# Patient Record
Sex: MALE | Race: WHITE | NOT HISPANIC OR LATINO | URBAN - METROPOLITAN AREA
[De-identification: names, ages, dates, MRNs, and addresses within clinical notes are randomized per-mention and may not be internally consistent; named-entity substitution may affect disease eponyms.]

---

## 2022-06-06 ENCOUNTER — TELEPHONE (OUTPATIENT)
Dept: TRANSPLANT | Facility: CLINIC | Age: 62
End: 2022-06-06
Payer: COMMERCIAL

## 2022-06-06 NOTE — TELEPHONE ENCOUNTER
----- Message from Taran Eason sent at 6/6/2022  5:22 PM CDT -----    Hepatology referral received and scanned into media; pt chart sent to referral nurse for medical review.     No labs sent with pt ref. Will call Md office.    Referring Provider: GREGG ALCALA NP   Phone: [phone]   Fax: [phone]                .

## 2022-06-07 ENCOUNTER — TELEPHONE (OUTPATIENT)
Dept: TRANSPLANT | Facility: CLINIC | Age: 62
End: 2022-06-07
Payer: COMMERCIAL

## 2022-06-07 ENCOUNTER — TELEPHONE (OUTPATIENT)
Dept: HEPATOLOGY | Facility: CLINIC | Age: 62
End: 2022-06-07
Payer: COMMERCIAL

## 2022-06-07 NOTE — TELEPHONE ENCOUNTER
----- Message from Taran Eason sent at 6/6/2022  5:22 PM CDT -----    Hepatology referral received and scanned into media; pt chart sent to referral nurse for medical review.     No labs sent with pt ref. Will call Md office.    Referring Provider: GREGG ALCALA NP   Phone: [phone]   Fax: [phone]                .

## 2022-06-07 NOTE — TELEPHONE ENCOUNTER
----- Message from Taran Eason sent at 6/7/2022 12:03 PM CDT -----  Regarding: FW: [facility]    Returned called to ref md office and confirm that we have the pt CMP.  .  ----- Message -----  From: Khadra Brooks  Sent: 6/7/2022  10:27 AM CDT  To: [facility] Liver Referral Pool  Subject: Rufina Santacruz @  [facility] calling to verify that a fax was sent with [facility].      Call: [phone]

## 2022-06-07 NOTE — TELEPHONE ENCOUNTER
Referral from Referring Provider: GREGG ALCALA NP   Phone: [phone]   Fax: [phone]     Pt with cirrhosis.  INR, CR WNL TB 2.1 Two beers a day and one mix drink a night.  Pt is working on decreasing intake and getting help from his PCP.  Pt informed he will have to maintain total sobriety prior to any consideration for transplant. Pt acknowledges this and is working on it. Pt will be scheduled in the hepatology clinic till sobriety. Called referring re any labs, requested they be faxed only labs received were INR and WBC with CR and TB written on referral form.     Insurance [employer] [address]  [phone]  YAQ [id number]

## 2022-06-07 NOTE — TELEPHONE ENCOUNTER
----- Message from Taran Eason sent at 6/7/2022  9:40 AM CDT -----    Called ref Md office and a message was sent to the staff for a CMP done on the pt.   .  ----- Message -----  From: Taran Eason  Sent: 6/6/2022   5:23 PM CDT  To: Taarn Gary      Hepatology referral received and scanned into media; pt chart sent to referral nurse for medical review.     No labs sent with pt ref. Will call Md office.    Referring Provider: GREGG ALCALA NP   Phone: [phone]   Fax: [phone]                .

## 2022-07-23 PROBLEM — K74.60 CIRRHOSIS OF LIVER WITHOUT ASCITES: Status: ACTIVE | Noted: 2022-07-23

## 2022-07-23 NOTE — PROGRESS NOTES
"   [facility] Consultation Note    Reason for Consult:  There were no encounter diagnoses.    PCP: Primary Doctor No   [address] / [place] [address]    HPI:  This is a 61 y.o. male here for evaluation of: Cirrhosis    61 yr old male, initially found to have thrombocytopenia, elevated enzymes, fatty liver on u/S with hepatosplenomegaly.  Raised suspicion for cirrhosis.  Had h/o alcohol intake, Two beers a day and one mix drink at night.  Pt is working on decreasing intake and getting help from his PCP.  Futher Eval of elevated enzymes revealed, ELVIA+, AMA+, IgG elevated 2280, elevated IgA 886.      Fibroscan showed S0 steatosis, F4 fibrosis c/w cirrhosis.      Liver biopsy on 5/5/2022 showed:  Chronic hepatitis mild to moderate steatosis, moderate chronic inflammation, and stage 4 fibrosis.     EGD 7/15/22: no varices.     Pt was informed he will have to maintain total sobriety prior to any consideration for transplant. Pt acknowledged this and is working on it.     I have told patient "No More alcohol"     INR normal, CR normal T Bili 2.1     Hepatitis C: No  Hepatitis B: No  Fatty liver: Yes  Encephalopathy: No  Post-hospital discharge: No  Symptoms: None    Primary hepatic manifestations:  Fatigue:Yes - improved  Edema:No  Ascites:No  Encephalopathy:No  Abdominal pain:No  GI bleeds: No  Pruritus:No  Weight Changes:No  Changes in Bowel habits: No  Muscle cramps:No    Risk factors for liver disease:  No jaundice  No transfusions  No IVDU  Did not snort cocaine or similar agents  Did not live with anyone with hepatitis B or C  Sexual partner not tested  No hepatotoxic medications  No exposure to industrial toxins  Alcohol: Stopped 8 weeks , around May 25-June 1. Drank 6-pack /day and 2 mixed drinks in the evening.   [place] off [place] as [place]sea , and [place]        ROS:  Constitutional: No fevers, chills, weight changes, fatigue  ENT: No allergies, nosebleeds,   CV: No chest " pain  Pulm: No cough, shortness of breath  Ophtho: No vision changes  GI/Liver: see HPI  Derm: No rash, itching  Heme: No swollen glands, bruising  MSK: Right foot gout , end of the day swelling end of the day. - talk to PCP. May want to get a doppler on the leg.   : No dysuria, hematuria, decrease in urine output  Endo: No hot or cold intolerance  Neuro: No confusion, disorientation, difficulty with sleep, memory, concentration, syncope, seizure  Psych: No anxiety, depression    Medical History:  has no past medical history on file.    Surgical History:  has no past surgical history on file.    Family History: family history is not on file..     Social History:      Review of patient's allergies indicates:   Allergen Reactions    Penicillins Anaphylaxis         Objective Findings:    Vital Signs:  /81 (BP Location: Right arm, Patient Position: Sitting, BP Method: Large (Automatic))   Pulse 70   Temp 98 °F (36.7 °C) (Oral)   Wt 100.7 kg (222 lb)   SpO2 98% Comment: RA  There is no height or weight on file to calculate BMI.    Physical Exam:  General Appearance: Well appearing in no acute distress  Head:   Normocephalic, without obvious abnormality  Eyes:    No scleral icterus, EOMI  ENT: Neck supple, Lips, mucosa, and tongue normal; teeth and gums normal  Lungs: CTA bilaterally in anterior and posterior fields, no wheezes, no crackles.  Heart:  Regular rate and rhythm, S1, S2 normal, no murmurs heard  Abdomen: Soft, non tender, non distended with positive bowel sounds in all four quadrants. No hepatosplenomegaly, ascites, or mass  Extremities: 2+ pulses, no clubbing, cyanosis or edema  Skin: No rash  Neurologic: CN II-XII intact, alert, oriented x 3. No asterixis      Labs:  No results found for: WBC, HGB, HCT, PLT, CHOL, TRIG, HDL, LDLDIRECT, INR, CREATININE, BUN, BILITOT, ALT, AST, ALKPHOS, NA, K, CL, CO2, TSH, PSA, GLUF, HGBA1C, MICROALBUR, AFP    Imaging:       Endoscopy:      Assessment:  No  diagnosis found.   Well-compensated Cirrhosis, alcohol related, could be autoimmune (positive 1:40 ASMA), but markers are borderline elevated and IgG 3000 was after vaccine. And liver biopsy showed chronic hepatitis but no autoimmune hepatitis features. And showed cirrhosis.   Viral hep markers neg, A1AT and cerulo normal.       Recommendations:  -  Labs today, and every 6 months after:  CBC, CMP, PT INR, PETH  -  Low salt in the diet, avoid canned, bottled and processed foods.  -  Continue current meds  -  Ultrasound of abdomen and AFP every 6 months, next due in November 2022   -  No alcohol, smoking, sedatives and meds with codeine.  -  Avoid high intake of Tylenol (more than 4 extra-strength (500 mg) pills in one day)  -  Call us if any bleeding, fevers, confusion, disorientation occur  -  Endoscopy: per GI MD locally  -  Transplant option discussed, will evaluate when MELD >15.  -  Return in 3 months.      No follow-ups on file.      Order summary:  No orders of the defined types were placed in this encounter.      Thank you so much for allowing me to participate in the care of Eulogio Aguero MD

## 2022-07-25 ENCOUNTER — LAB VISIT (OUTPATIENT)
Dept: LAB | Facility: HOSPITAL | Age: 62
End: 2022-07-25
Attending: INTERNAL MEDICINE
Payer: COMMERCIAL

## 2022-07-25 ENCOUNTER — OFFICE VISIT (OUTPATIENT)
Dept: HEPATOLOGY | Facility: CLINIC | Age: 62
End: 2022-07-25
Payer: COMMERCIAL

## 2022-07-25 VITALS
SYSTOLIC BLOOD PRESSURE: 132 MMHG | OXYGEN SATURATION: 98 % | HEART RATE: 70 BPM | WEIGHT: 222 LBS | DIASTOLIC BLOOD PRESSURE: 81 MMHG | TEMPERATURE: 98 F

## 2022-07-25 DIAGNOSIS — K74.69 OTHER CIRRHOSIS OF LIVER: Primary | ICD-10-CM

## 2022-07-25 DIAGNOSIS — K74.69 OTHER CIRRHOSIS OF LIVER: ICD-10-CM

## 2022-07-25 LAB
AFP SERPL-MCNC: 4.3 NG/ML (ref 0–8.4)
ALBUMIN SERPL BCP-MCNC: 3.6 G/DL (ref 3.5–5.2)
ALP SERPL-CCNC: 75 U/L (ref 55–135)
ALT SERPL W/O P-5'-P-CCNC: 31 U/L (ref 10–44)
ANION GAP SERPL CALC-SCNC: 9 MMOL/L (ref 8–16)
AST SERPL-CCNC: 48 U/L (ref 10–40)
BASOPHILS # BLD AUTO: 0.06 K/UL (ref 0–0.2)
BASOPHILS NFR BLD: 1.2 % (ref 0–1.9)
BILIRUB SERPL-MCNC: 2.4 MG/DL (ref 0.1–1)
BUN SERPL-MCNC: 16 MG/DL (ref 6–20)
CALCIUM SERPL-MCNC: 10.2 MG/DL (ref 8.7–10.5)
CHLORIDE SERPL-SCNC: 99 MMOL/L (ref 95–110)
CO2 SERPL-SCNC: 23 MMOL/L (ref 23–29)
CREAT SERPL-MCNC: 1 MG/DL (ref 0.5–1.4)
DIFFERENTIAL METHOD: ABNORMAL
EOSINOPHIL # BLD AUTO: 0.2 K/UL (ref 0–0.5)
EOSINOPHIL NFR BLD: 3.4 % (ref 0–8)
ERYTHROCYTE [DISTWIDTH] IN BLOOD BY AUTOMATED COUNT: 13.3 % (ref 11.5–14.5)
EST. GFR  (AFRICAN AMERICAN): >60 ML/MIN/1.73 M^2
EST. GFR  (NON AFRICAN AMERICAN): >60 ML/MIN/1.73 M^2
GLUCOSE SERPL-MCNC: 116 MG/DL (ref 70–110)
HCT VFR BLD AUTO: 37.4 % (ref 40–54)
HGB BLD-MCNC: 13 G/DL (ref 14–18)
IMM GRANULOCYTES # BLD AUTO: 0.02 K/UL (ref 0–0.04)
IMM GRANULOCYTES NFR BLD AUTO: 0.4 % (ref 0–0.5)
INR PPP: 1.3 (ref 0.8–1.2)
LYMPHOCYTES # BLD AUTO: 1.2 K/UL (ref 1–4.8)
LYMPHOCYTES NFR BLD: 23.6 % (ref 18–48)
MCH RBC QN AUTO: 35.8 PG (ref 27–31)
MCHC RBC AUTO-ENTMCNC: 34.8 G/DL (ref 32–36)
MCV RBC AUTO: 103 FL (ref 82–98)
MONOCYTES # BLD AUTO: 0.5 K/UL (ref 0.3–1)
MONOCYTES NFR BLD: 10.5 % (ref 4–15)
NEUTROPHILS # BLD AUTO: 3.1 K/UL (ref 1.8–7.7)
NEUTROPHILS NFR BLD: 60.9 % (ref 38–73)
NRBC BLD-RTO: 0 /100 WBC
PLATELET # BLD AUTO: 97 K/UL (ref 150–450)
PMV BLD AUTO: 10.1 FL (ref 9.2–12.9)
POTASSIUM SERPL-SCNC: 4.5 MMOL/L (ref 3.5–5.1)
PROT SERPL-MCNC: 8.5 G/DL (ref 6–8.4)
PROTHROMBIN TIME: 13.5 SEC (ref 9–12.5)
RBC # BLD AUTO: 3.63 M/UL (ref 4.6–6.2)
SODIUM SERPL-SCNC: 131 MMOL/L (ref 136–145)
WBC # BLD AUTO: 5.05 K/UL (ref 3.9–12.7)

## 2022-07-25 PROCEDURE — 1159F MED LIST DOCD IN RCRD: CPT | Mod: CPTII,S$GLB,, | Performed by: INTERNAL MEDICINE

## 2022-07-25 PROCEDURE — 1159F PR MEDICATION LIST DOCUMENTED IN MEDICAL RECORD: ICD-10-PCS | Mod: CPTII,S$GLB,, | Performed by: INTERNAL MEDICINE

## 2022-07-25 PROCEDURE — 3079F DIAST BP 80-89 MM HG: CPT | Mod: CPTII,S$GLB,, | Performed by: INTERNAL MEDICINE

## 2022-07-25 PROCEDURE — 99999 PR PBB SHADOW E&M-EST. PATIENT-LVL III: ICD-10-PCS | Mod: PBBFAC,,, | Performed by: INTERNAL MEDICINE

## 2022-07-25 PROCEDURE — 99205 PR OFFICE/OUTPT VISIT, NEW, LEVL V, 60-74 MIN: ICD-10-PCS | Mod: S$GLB,,, | Performed by: INTERNAL MEDICINE

## 2022-07-25 PROCEDURE — 80053 COMPREHEN METABOLIC PANEL: CPT | Performed by: INTERNAL MEDICINE

## 2022-07-25 PROCEDURE — 3075F SYST BP GE 130 - 139MM HG: CPT | Mod: CPTII,S$GLB,, | Performed by: INTERNAL MEDICINE

## 2022-07-25 PROCEDURE — 82105 ALPHA-FETOPROTEIN SERUM: CPT | Performed by: INTERNAL MEDICINE

## 2022-07-25 PROCEDURE — 85610 PROTHROMBIN TIME: CPT | Performed by: INTERNAL MEDICINE

## 2022-07-25 PROCEDURE — 3079F PR MOST RECENT DIASTOLIC BLOOD PRESSURE 80-89 MM HG: ICD-10-PCS | Mod: CPTII,S$GLB,, | Performed by: INTERNAL MEDICINE

## 2022-07-25 PROCEDURE — 3075F PR MOST RECENT SYSTOLIC BLOOD PRESS GE 130-139MM HG: ICD-10-PCS | Mod: CPTII,S$GLB,, | Performed by: INTERNAL MEDICINE

## 2022-07-25 PROCEDURE — 99999 PR PBB SHADOW E&M-EST. PATIENT-LVL III: CPT | Mod: PBBFAC,,, | Performed by: INTERNAL MEDICINE

## 2022-07-25 PROCEDURE — 99205 OFFICE O/P NEW HI 60 MIN: CPT | Mod: S$GLB,,, | Performed by: INTERNAL MEDICINE

## 2022-07-25 PROCEDURE — 80321 ALCOHOLS BIOMARKERS 1OR 2: CPT | Performed by: INTERNAL MEDICINE

## 2022-07-25 PROCEDURE — 85025 COMPLETE CBC W/AUTO DIFF WBC: CPT | Performed by: INTERNAL MEDICINE

## 2022-07-25 PROCEDURE — 36415 COLL VENOUS BLD VENIPUNCTURE: CPT | Performed by: INTERNAL MEDICINE

## 2022-07-25 RX ORDER — LISINOPRIL 20 MG/1
TABLET ORAL
COMMUNITY

## 2022-07-25 RX ORDER — FEBUXOSTAT 40 MG/1
TABLET, FILM COATED ORAL
COMMUNITY

## 2022-07-25 RX ORDER — LORAZEPAM 0.5 MG/1
TABLET ORAL
COMMUNITY

## 2022-07-25 RX ORDER — METOPROLOL SUCCINATE 50 MG/1
TABLET, EXTENDED RELEASE ORAL
COMMUNITY

## 2022-07-25 NOTE — Clinical Note
Recommendations: -  Labs today, and every 6 months after:  CBC, CMP, PT INR, PETH -  Low salt in the diet, avoid canned, bottled and processed foods. -  Continue current meds -  Ultrasound of abdomen and AFP every 6 months, next due in November 2022  -  No alcohol, smoking, sedatives and meds with codeine. -  Avoid high intake of Tylenol (more than 4 extra-strength (500 mg) pills in one day) -  Call us if any bleeding, fevers, confusion, disorientation occur -  Endoscopy: per GI MD locally -  Transplant option discussed, will evaluate when MELD >15. -  Return in 6 months.

## 2022-07-27 LAB
PETH 16:0/18.1 (POPETH): 87 NG/ML
PETH 16:0/18.2 (PLPETH): 76 NG/ML

## 2022-07-29 ENCOUNTER — TELEPHONE (OUTPATIENT)
Dept: HEPATOLOGY | Facility: CLINIC | Age: 62
End: 2022-07-29
Payer: COMMERCIAL

## 2022-07-29 NOTE — TELEPHONE ENCOUNTER
----- Message from Jt Mcallister MA sent at 7/27/2022  9:19 AM CDT -----    ----- Message -----  From: June Aguero MD  Sent: 7/26/2022   8:08 PM CDT  To: Laci Watkins Staff    Liver labs are good. Only bilirubin mildly elevated.    MELD-Na score: 18 at 7/25/2022 10:25 AM.  This is mostly driven by Na 131 and T bili 2.4.  No symptoms to reflect liver failure.

## 2022-07-29 NOTE — TELEPHONE ENCOUNTER
----- Message -----  From: June Aguero MD     Liver labs are good. Only bilirubin mildly elevated.    MELD-Na score: 18 at 7/25/2022 10:25 AM.  This is mostly driven by Na 131 and T bili 2.4.  No symptoms to reflect liver failure.      Patient called and message relayed from Dr ISABELLA Aguero. Patient stated that is good news.

## 2022-07-29 NOTE — TELEPHONE ENCOUNTER
----- Message from June Aguero MD sent at 7/25/2022  9:55 AM CDT -----  Recommendations:  -  Labs today, and every 6 months after:  CBC, CMP, PT INR, PETH  -  Low salt in the diet, avoid canned, bottled and processed foods.  -  Continue current meds  -  Ultrasound of abdomen and AFP every 6 months, next due in November 2022   -  No alcohol, smoking, sedatives and meds with codeine.  -  Avoid high intake of Tylenol (more than 4 extra-strength (500 mg) pills in one day)  -  Call us if any bleeding, fevers, confusion, disorientation occur  -  Endoscopy: per GI MD locally  -  Transplant option discussed, will evaluate when MELD >15.  -  Return in 6 months.

## 2022-07-30 ENCOUNTER — PATIENT MESSAGE (OUTPATIENT)
Dept: HEPATOLOGY | Facility: CLINIC | Age: 62
End: 2022-07-30
Payer: COMMERCIAL

## 2022-11-14 ENCOUNTER — LAB VISIT (OUTPATIENT)
Dept: LAB | Facility: HOSPITAL | Age: 62
End: 2022-11-14
Attending: INTERNAL MEDICINE
Payer: COMMERCIAL

## 2022-11-14 ENCOUNTER — HOSPITAL ENCOUNTER (OUTPATIENT)
Dept: RADIOLOGY | Facility: HOSPITAL | Age: 62
Discharge: HOME | End: 2022-11-14
Attending: INTERNAL MEDICINE
Payer: COMMERCIAL

## 2022-11-14 DIAGNOSIS — K74.69 OTHER CIRRHOSIS OF LIVER: ICD-10-CM

## 2022-11-14 DIAGNOSIS — K74.69 OTHER CIRRHOSIS OF LIVER: Primary | ICD-10-CM

## 2022-11-14 LAB
ALBUMIN SERPL BCP-MCNC: 3.8 G/DL (ref 3.5–5.2)
ALP SERPL-CCNC: 58 U/L (ref 55–135)
ALT SERPL W/O P-5'-P-CCNC: 26 U/L (ref 10–44)
ANION GAP SERPL CALC-SCNC: 11 MMOL/L (ref 8–16)
AST SERPL-CCNC: 46 U/L (ref 10–40)
BASOPHILS # BLD AUTO: 0.04 K/UL (ref 0–0.2)
BASOPHILS NFR BLD: 0.9 % (ref 0–1.9)
BILIRUB SERPL-MCNC: 3.8 MG/DL (ref 0.1–1)
BUN SERPL-MCNC: 19 MG/DL (ref 8–23)
CALCIUM SERPL-MCNC: 10.1 MG/DL (ref 8.7–10.5)
CHLORIDE SERPL-SCNC: 99 MMOL/L (ref 95–110)
CO2 SERPL-SCNC: 23 MMOL/L (ref 23–29)
CREAT SERPL-MCNC: 1.1 MG/DL (ref 0.5–1.4)
DIFFERENTIAL METHOD: ABNORMAL
EOSINOPHIL # BLD AUTO: 0.2 K/UL (ref 0–0.5)
EOSINOPHIL NFR BLD: 3.3 % (ref 0–8)
ERYTHROCYTE [DISTWIDTH] IN BLOOD BY AUTOMATED COUNT: 13.2 % (ref 11.5–14.5)
EST. GFR  (NO RACE VARIABLE): >60 ML/MIN/1.73 M^2
GLUCOSE SERPL-MCNC: 126 MG/DL (ref 70–110)
HCT VFR BLD AUTO: 37.9 % (ref 40–54)
HGB BLD-MCNC: 13.2 G/DL (ref 14–18)
IMM GRANULOCYTES # BLD AUTO: 0.01 K/UL (ref 0–0.04)
IMM GRANULOCYTES NFR BLD AUTO: 0.2 % (ref 0–0.5)
INR PPP: 1.3 (ref 0.8–1.2)
LYMPHOCYTES # BLD AUTO: 1 K/UL (ref 1–4.8)
LYMPHOCYTES NFR BLD: 21.2 % (ref 18–48)
MCH RBC QN AUTO: 36.4 PG (ref 27–31)
MCHC RBC AUTO-ENTMCNC: 34.8 G/DL (ref 32–36)
MCV RBC AUTO: 104 FL (ref 82–98)
MONOCYTES # BLD AUTO: 0.4 K/UL (ref 0.3–1)
MONOCYTES NFR BLD: 8.9 % (ref 4–15)
NEUTROPHILS # BLD AUTO: 2.9 K/UL (ref 1.8–7.7)
NEUTROPHILS NFR BLD: 65.5 % (ref 38–73)
NRBC BLD-RTO: 0 /100 WBC
PLATELET # BLD AUTO: 62 K/UL (ref 150–450)
PMV BLD AUTO: 11.5 FL (ref 9.2–12.9)
POTASSIUM SERPL-SCNC: 4.3 MMOL/L (ref 3.5–5.1)
PROT SERPL-MCNC: 8.1 G/DL (ref 6–8.4)
PROTHROMBIN TIME: 14.1 SEC (ref 9–12.5)
RBC # BLD AUTO: 3.63 M/UL (ref 4.6–6.2)
SODIUM SERPL-SCNC: 133 MMOL/L (ref 136–145)
WBC # BLD AUTO: 4.48 K/UL (ref 3.9–12.7)

## 2022-11-14 PROCEDURE — 85025 COMPLETE CBC W/AUTO DIFF WBC: CPT | Performed by: INTERNAL MEDICINE

## 2022-11-14 PROCEDURE — 36415 COLL VENOUS BLD VENIPUNCTURE: CPT | Performed by: INTERNAL MEDICINE

## 2022-11-14 PROCEDURE — 76705 ECHO EXAM OF ABDOMEN: CPT | Mod: 26,,, | Performed by: RADIOLOGY

## 2022-11-14 PROCEDURE — 80053 COMPREHEN METABOLIC PANEL: CPT | Performed by: INTERNAL MEDICINE

## 2022-11-14 PROCEDURE — 85610 PROTHROMBIN TIME: CPT | Performed by: INTERNAL MEDICINE

## 2022-11-14 PROCEDURE — 76705 ECHO EXAM OF ABDOMEN: CPT | Mod: TC

## 2022-11-14 PROCEDURE — 76705 US ABDOMEN LIMITED: ICD-10-PCS | Mod: 26,,, | Performed by: RADIOLOGY

## 2022-11-15 ENCOUNTER — PATIENT MESSAGE (OUTPATIENT)
Dept: HEPATOLOGY | Facility: CLINIC | Age: 62
End: 2022-11-15
Payer: COMMERCIAL

## 2022-11-22 ENCOUNTER — LAB VISIT (OUTPATIENT)
Dept: LAB | Facility: HOSPITAL | Age: 62
End: 2022-11-22
Payer: COMMERCIAL

## 2022-11-22 DIAGNOSIS — K74.69 OTHER CIRRHOSIS OF LIVER: ICD-10-CM

## 2022-11-22 LAB
INR PPP: 1.2 (ref 0.8–1.2)
PROTHROMBIN TIME: 13.6 SEC (ref 9–12.5)

## 2022-11-22 PROCEDURE — 80053 COMPREHEN METABOLIC PANEL: CPT | Performed by: INTERNAL MEDICINE

## 2022-11-22 PROCEDURE — 82248 BILIRUBIN DIRECT: CPT | Performed by: INTERNAL MEDICINE

## 2022-11-22 PROCEDURE — 85610 PROTHROMBIN TIME: CPT | Performed by: INTERNAL MEDICINE

## 2022-11-22 PROCEDURE — 85025 COMPLETE CBC W/AUTO DIFF WBC: CPT | Performed by: INTERNAL MEDICINE

## 2022-11-22 PROCEDURE — 36415 COLL VENOUS BLD VENIPUNCTURE: CPT | Performed by: INTERNAL MEDICINE

## 2022-11-23 LAB
ALBUMIN SERPL BCP-MCNC: 3.7 G/DL (ref 3.5–5.2)
ALP SERPL-CCNC: 59 U/L (ref 55–135)
ALT SERPL W/O P-5'-P-CCNC: 28 U/L (ref 10–44)
ANION GAP SERPL CALC-SCNC: 8 MMOL/L (ref 8–16)
AST SERPL-CCNC: 43 U/L (ref 10–40)
BASOPHILS # BLD AUTO: 0.05 K/UL (ref 0–0.2)
BASOPHILS NFR BLD: 1 % (ref 0–1.9)
BILIRUB DIRECT SERPL-MCNC: 1 MG/DL (ref 0.1–0.3)
BILIRUB SERPL-MCNC: 2.3 MG/DL (ref 0.1–1)
BUN SERPL-MCNC: 15 MG/DL (ref 8–23)
CALCIUM SERPL-MCNC: 10 MG/DL (ref 8.7–10.5)
CHLORIDE SERPL-SCNC: 103 MMOL/L (ref 95–110)
CO2 SERPL-SCNC: 25 MMOL/L (ref 23–29)
CREAT SERPL-MCNC: 1.2 MG/DL (ref 0.5–1.4)
DIFFERENTIAL METHOD: ABNORMAL
EOSINOPHIL # BLD AUTO: 0.2 K/UL (ref 0–0.5)
EOSINOPHIL NFR BLD: 3.7 % (ref 0–8)
ERYTHROCYTE [DISTWIDTH] IN BLOOD BY AUTOMATED COUNT: 13.6 % (ref 11.5–14.5)
EST. GFR  (NO RACE VARIABLE): >60 ML/MIN/1.73 M^2
GLUCOSE SERPL-MCNC: 105 MG/DL (ref 70–110)
HCT VFR BLD AUTO: 41 % (ref 40–54)
HGB BLD-MCNC: 13.7 G/DL (ref 14–18)
IMM GRANULOCYTES # BLD AUTO: 0.01 K/UL (ref 0–0.04)
IMM GRANULOCYTES NFR BLD AUTO: 0.2 % (ref 0–0.5)
LYMPHOCYTES # BLD AUTO: 1.5 K/UL (ref 1–4.8)
LYMPHOCYTES NFR BLD: 30.9 % (ref 18–48)
MCH RBC QN AUTO: 35.5 PG (ref 27–31)
MCHC RBC AUTO-ENTMCNC: 33.4 G/DL (ref 32–36)
MCV RBC AUTO: 106 FL (ref 82–98)
MONOCYTES # BLD AUTO: 0.7 K/UL (ref 0.3–1)
MONOCYTES NFR BLD: 13.2 % (ref 4–15)
NEUTROPHILS # BLD AUTO: 2.5 K/UL (ref 1.8–7.7)
NEUTROPHILS NFR BLD: 51 % (ref 38–73)
NRBC BLD-RTO: 0 /100 WBC
PLATELET # BLD AUTO: 88 K/UL (ref 150–450)
PMV BLD AUTO: 11 FL (ref 9.2–12.9)
POTASSIUM SERPL-SCNC: 4.5 MMOL/L (ref 3.5–5.1)
PROT SERPL-MCNC: 7.9 G/DL (ref 6–8.4)
RBC # BLD AUTO: 3.86 M/UL (ref 4.6–6.2)
SODIUM SERPL-SCNC: 136 MMOL/L (ref 136–145)
WBC # BLD AUTO: 4.92 K/UL (ref 3.9–12.7)

## 2022-11-28 ENCOUNTER — PATIENT MESSAGE (OUTPATIENT)
Dept: HEPATOLOGY | Facility: CLINIC | Age: 62
End: 2022-11-28
Payer: COMMERCIAL

## 2022-11-29 ENCOUNTER — TELEPHONE (OUTPATIENT)
Dept: HEPATOLOGY | Facility: CLINIC | Age: 62
End: 2022-11-29
Payer: COMMERCIAL

## 2022-11-29 NOTE — TELEPHONE ENCOUNTER
----- Message from Kecia Gross RN sent at 11/28/2022  3:23 PM CST -----  Regarding: FW: Lab Appt    ----- Message -----  From: Kam Louise  Sent: 11/28/2022   3:17 PM CST  To: [facility] Clinical Staff  Subject: Lab Appt                                         Pt's wife called stating that when they saw Dr. Aguero she stated that she would like the pt to have labs drawn in January. She would like to have those labs drawn at [facility] if he don't have to see the Dr. Aguero      Contact Eulogio @ [phone]

## 2022-11-29 NOTE — TELEPHONE ENCOUNTER
Pt schd for labs  and an appt with Dr. Aguero. Advised pt per Dr. Gates, Meld score is 14. ADALBERTO LINDO

## 2023-02-09 ENCOUNTER — PATIENT MESSAGE (OUTPATIENT)
Dept: RESEARCH | Facility: HOSPITAL | Age: 63
End: 2023-02-09
Payer: COMMERCIAL

## 2023-02-13 ENCOUNTER — PATIENT MESSAGE (OUTPATIENT)
Dept: RESEARCH | Facility: HOSPITAL | Age: 63
End: 2023-02-13
Payer: COMMERCIAL

## 2023-02-13 ENCOUNTER — RESEARCH ENCOUNTER (OUTPATIENT)
Dept: RESEARCH | Facility: HOSPITAL | Age: 63
End: 2023-02-13
Payer: COMMERCIAL

## 2023-02-13 NOTE — PROGRESS NOTES
RESEARCH STUDY CONSENT ENCOUNTER  ORGAN TRANSPLANT  [facility]SON    Study Title: Role of Tumor-Induced Immune Tolerance in the Patient Response to Locoregional Therapy: Implications in Assessment Risk of Hepatocellular Carcinoma Recurrence Following Liver Transplantation    IRB #: 2016.131.B    IRB Approval Date: 6/8/2016    : Edison Guerrero MD  Sub-investigator: Leo Morales, PhD    Patient Number: [id number]      Patient was scheduled for labs on (date: 2/23/2023).     This study is an observational study to evaluate patients receiving transarterial chemoembolization (TACE) or transarterial radioembolization (TARE) therapy to define the link between tumor-elicited peripheral cell populations, and the risk of hepatocellular carcinoma (HCC) recurrence before orthotopic liver transplantation (OLT). [IRB 2016.131.B] Patient is being consented as a control for this study and participation in this study will end after specimen collection. This patient is NOT undergoing TACE or TARE, and DOES NOT have HCC.     Present for discussion: patient Eulogio Faustin, consenter Rita Pizano, witness Benoit Tony  Is LAR Consenting for Subject: YES/NO: no    Prior to the Informed Consent (IC) being signed, or any protocol required testing, procedure, or intervention being performed, the following was done or discussed with Eulogio Faustin:    Purpose of the Study, Qualifications to Participate: YES/NO: yes  Study Design, Schedule and Procedures: YES/NO: yes  Risks, Benefits, Alternative Treatments, Compensation and Costs: YES/NO: yes  Confidentiality and HIPAA Authorization for Release of Medical Records for the research trial/subject's right/study related injury: YES/NO: yes  Study related contact information: YES/NO: yes  Voluntary Participation and Withdrawal from the research trial at any time: YES/NO: yes  Patient has been offered the opportunity to ask questions regarding the study and all questions  were answered satisfactorily: YES/NO: yes  Patient verbalizes understanding of the study/procedures and agrees to participate: YES/NO: yes  CRC and PI contact information given to patient:YES/NO: yes  Verification the ICF was appropriately completed: YES/NO: yes  Signed copy given to patient: YES/NO: yes  Copy in patient's chart and original uploaded to Epic: YES/NO: yes    Research staff present during consent: consenter Rita Pizano, witness Benoit Tony      No study procedures (I.e. specimen collection) were performed before the informed consent was signed.     In accordance with the study protocol, Research Lab orders will be placed and specimens will be collected on (date: 2/23/2023) in:  [facility] VNP: YES/NO: no  [facility] LABTX: YES/NO: no  [facility] IM: YES/NO: no  Patient scheduled for labs at [facility]    Rita Pizano  Admin Research- Liver Transplant

## 2023-02-23 ENCOUNTER — LAB VISIT (OUTPATIENT)
Dept: LAB | Facility: HOSPITAL | Age: 63
End: 2023-02-23
Attending: INTERNAL MEDICINE
Payer: COMMERCIAL

## 2023-02-23 DIAGNOSIS — K74.69 OTHER CIRRHOSIS OF LIVER: ICD-10-CM

## 2023-02-23 LAB
INR PPP: 1.4 (ref 0.8–1.2)
PROTHROMBIN TIME: 14.1 SEC (ref 9–12.5)

## 2023-02-23 PROCEDURE — 36415 COLL VENOUS BLD VENIPUNCTURE: CPT | Mod: PO | Performed by: INTERNAL MEDICINE

## 2023-02-23 PROCEDURE — 80053 COMPREHEN METABOLIC PANEL: CPT | Performed by: INTERNAL MEDICINE

## 2023-02-23 PROCEDURE — 80321 ALCOHOLS BIOMARKERS 1OR 2: CPT | Performed by: INTERNAL MEDICINE

## 2023-02-23 PROCEDURE — 85025 COMPLETE CBC W/AUTO DIFF WBC: CPT | Performed by: INTERNAL MEDICINE

## 2023-02-23 PROCEDURE — 85610 PROTHROMBIN TIME: CPT | Performed by: INTERNAL MEDICINE

## 2023-02-23 PROCEDURE — 82105 ALPHA-FETOPROTEIN SERUM: CPT | Performed by: INTERNAL MEDICINE

## 2023-02-24 LAB
AFP SERPL-MCNC: 4.1 NG/ML (ref 0–8.4)
ALBUMIN SERPL BCP-MCNC: 4.1 G/DL (ref 3.5–5.2)
ALP SERPL-CCNC: 69 U/L (ref 55–135)
ALT SERPL W/O P-5'-P-CCNC: 26 U/L (ref 10–44)
ANION GAP SERPL CALC-SCNC: 12 MMOL/L (ref 8–16)
AST SERPL-CCNC: 45 U/L (ref 10–40)
BASOPHILS # BLD AUTO: 0.03 K/UL (ref 0–0.2)
BASOPHILS NFR BLD: 0.6 % (ref 0–1.9)
BILIRUB SERPL-MCNC: 2.7 MG/DL (ref 0.1–1)
BUN SERPL-MCNC: 21 MG/DL (ref 8–23)
CALCIUM SERPL-MCNC: 10.3 MG/DL (ref 8.7–10.5)
CHLORIDE SERPL-SCNC: 98 MMOL/L (ref 95–110)
CO2 SERPL-SCNC: 24 MMOL/L (ref 23–29)
CREAT SERPL-MCNC: 1.5 MG/DL (ref 0.5–1.4)
DIFFERENTIAL METHOD: ABNORMAL
EOSINOPHIL # BLD AUTO: 0.1 K/UL (ref 0–0.5)
EOSINOPHIL NFR BLD: 2.3 % (ref 0–8)
ERYTHROCYTE [DISTWIDTH] IN BLOOD BY AUTOMATED COUNT: 14.5 % (ref 11.5–14.5)
EST. GFR  (NO RACE VARIABLE): 52.6 ML/MIN/1.73 M^2
GLUCOSE SERPL-MCNC: 119 MG/DL (ref 70–110)
HCT VFR BLD AUTO: 41.2 % (ref 40–54)
HGB BLD-MCNC: 14 G/DL (ref 14–18)
IMM GRANULOCYTES # BLD AUTO: 0.01 K/UL (ref 0–0.04)
IMM GRANULOCYTES NFR BLD AUTO: 0.2 % (ref 0–0.5)
LYMPHOCYTES # BLD AUTO: 1.2 K/UL (ref 1–4.8)
LYMPHOCYTES NFR BLD: 25.2 % (ref 18–48)
MCH RBC QN AUTO: 35.4 PG (ref 27–31)
MCHC RBC AUTO-ENTMCNC: 34 G/DL (ref 32–36)
MCV RBC AUTO: 104 FL (ref 82–98)
MONOCYTES # BLD AUTO: 0.6 K/UL (ref 0.3–1)
MONOCYTES NFR BLD: 11.9 % (ref 4–15)
NEUTROPHILS # BLD AUTO: 2.8 K/UL (ref 1.8–7.7)
NEUTROPHILS NFR BLD: 59.8 % (ref 38–73)
NRBC BLD-RTO: 0 /100 WBC
PLATELET # BLD AUTO: 58 K/UL (ref 150–450)
PMV BLD AUTO: 11.9 FL (ref 9.2–12.9)
POTASSIUM SERPL-SCNC: 4.3 MMOL/L (ref 3.5–5.1)
PROT SERPL-MCNC: 8.1 G/DL (ref 6–8.4)
RBC # BLD AUTO: 3.96 M/UL (ref 4.6–6.2)
SODIUM SERPL-SCNC: 134 MMOL/L (ref 136–145)
WBC # BLD AUTO: 4.72 K/UL (ref 3.9–12.7)

## 2023-02-28 LAB
CLINICAL BIOCHEMIST REVIEW: NORMAL
PLPETH BLD-MCNC: 313 NG/ML
POPETH BLD-MCNC: 388 NG/ML

## 2023-03-02 ENCOUNTER — TELEPHONE (OUTPATIENT)
Dept: HEPATOLOGY | Facility: CLINIC | Age: 63
End: 2023-03-02

## 2023-03-02 ENCOUNTER — OFFICE VISIT (OUTPATIENT)
Dept: HEPATOLOGY | Facility: CLINIC | Age: 63
End: 2023-03-02
Payer: COMMERCIAL

## 2023-03-02 VITALS
TEMPERATURE: 99 F | DIASTOLIC BLOOD PRESSURE: 86 MMHG | HEIGHT: 72 IN | HEART RATE: 84 BPM | BODY MASS INDEX: 28.1 KG/M2 | WEIGHT: 207.44 LBS | SYSTOLIC BLOOD PRESSURE: 136 MMHG | OXYGEN SATURATION: 99 %

## 2023-03-02 DIAGNOSIS — K74.69 OTHER CIRRHOSIS OF LIVER: Primary | ICD-10-CM

## 2023-03-02 PROCEDURE — 3075F PR MOST RECENT SYSTOLIC BLOOD PRESS GE 130-139MM HG: ICD-10-PCS | Mod: CPTII,S$GLB,, | Performed by: INTERNAL MEDICINE

## 2023-03-02 PROCEDURE — 1159F MED LIST DOCD IN RCRD: CPT | Mod: CPTII,S$GLB,, | Performed by: INTERNAL MEDICINE

## 2023-03-02 PROCEDURE — 3079F PR MOST RECENT DIASTOLIC BLOOD PRESSURE 80-89 MM HG: ICD-10-PCS | Mod: CPTII,S$GLB,, | Performed by: INTERNAL MEDICINE

## 2023-03-02 PROCEDURE — 99214 OFFICE O/P EST MOD 30 MIN: CPT | Mod: S$GLB,,, | Performed by: INTERNAL MEDICINE

## 2023-03-02 PROCEDURE — 99999 PR PBB SHADOW E&M-EST. PATIENT-LVL III: ICD-10-PCS | Mod: PBBFAC,,, | Performed by: INTERNAL MEDICINE

## 2023-03-02 PROCEDURE — 99999 PR PBB SHADOW E&M-EST. PATIENT-LVL III: CPT | Mod: PBBFAC,,, | Performed by: INTERNAL MEDICINE

## 2023-03-02 PROCEDURE — 1159F PR MEDICATION LIST DOCUMENTED IN MEDICAL RECORD: ICD-10-PCS | Mod: CPTII,S$GLB,, | Performed by: INTERNAL MEDICINE

## 2023-03-02 PROCEDURE — 3079F DIAST BP 80-89 MM HG: CPT | Mod: CPTII,S$GLB,, | Performed by: INTERNAL MEDICINE

## 2023-03-02 PROCEDURE — 3075F SYST BP GE 130 - 139MM HG: CPT | Mod: CPTII,S$GLB,, | Performed by: INTERNAL MEDICINE

## 2023-03-02 PROCEDURE — 3008F PR BODY MASS INDEX (BMI) DOCUMENTED: ICD-10-PCS | Mod: CPTII,S$GLB,, | Performed by: INTERNAL MEDICINE

## 2023-03-02 PROCEDURE — 3008F BODY MASS INDEX DOCD: CPT | Mod: CPTII,S$GLB,, | Performed by: INTERNAL MEDICINE

## 2023-03-02 PROCEDURE — 99214 PR OFFICE/OUTPT VISIT, EST, LEVL IV, 30-39 MIN: ICD-10-PCS | Mod: S$GLB,,, | Performed by: INTERNAL MEDICINE

## 2023-03-02 RX ORDER — CLOTRIMAZOLE AND BETAMETHASONE DIPROPIONATE 10; .64 MG/G; MG/G
CREAM TOPICAL
COMMUNITY

## 2023-03-02 NOTE — PATIENT INSTRUCTIONS
Recommendations:  -  Get patient's consent to get liver biopsy slides for Pathology review and Path conf.    -  I have told him his cirrhosis could be due alcohol, and he needs to stop all alcohol from hereon (3/2/23).   -  Labs every 3 months next in 3 months before next visit:  CBC, CMP, PT INR, PETH  -  Low salt in the diet, avoid canned, bottled and processed foods.  -  Continue current meds  -  Ultrasound of abdomen and AFP every 6 months, next due in May 2023.   -  No alcohol, smoking, sedatives and meds with codeine.  -  Avoid high intake of Tylenol (more than 4 extra-strength (500 mg) pills in one day)  -  Call us if any bleeding, fevers, confusion, disorientation occur  -  Endoscopy: per GI MD locally  -  Transplant option discussed, will evaluate when MELD >15.  -  Return in 3 months.

## 2023-03-02 NOTE — PROGRESS NOTES
"   [facility] Follow-up Note    Reason for Consult:  There were no encounter diagnoses.    PCP: Primary Doctor No       HPI:  This is a 62 y.o. male here for evaluation of: Cirrhosis    61 yr old male, initially seen on 7/26/22:  found to have thrombocytopenia, elevated enzymes, fatty liver on u/S with hepatosplenomegaly.  Raised suspicion for cirrhosis.  Had h/o alcohol intake, Two beers a day and one mix drink at night.  Pt is working on decreasing intake and getting help from his PCP.  Futher Eval of elevated enzymes revealed, ELVIA+, AMA+, IgG elevated 2280, elevated IgA 886.    H/o renal failure - saw nephrology, improved, and was released from that service.     Fibroscan showed S0 steatosis, F4 fibrosis c/w cirrhosis.      Liver biopsy on 5/5/2022 showed: (done in [place]).   Chronic hepatitis mild to moderate steatosis, moderate chronic inflammation, and stage 4 fibrosis.   Need to get slides for pathology review - get patient consent to have slides sent to us.     Labs 2/23/23:  PETH 388, MELD 20.     MELD-Na score: 20 at 2/23/2023  9:10 AM  MELD score: 18 at 2/23/2023  9:10 AM  Calculated from:  Serum Creatinine: 1.5 mg/dL at 2/23/2023  9:10 AM  Serum Sodium: 134 mmol/L at 2/23/2023  9:10 AM  Total Bilirubin: 2.7 mg/dL at 2/23/2023  9:10 AM  INR(ratio): 1.4 at 2/23/2023  9:10 AM  Age: 62 years       EGD 7/15/22: no varices.     Pt was informed he will have to maintain total sobriety prior to any consideration for transplant. Pt acknowledged this and is working on it.     I have told patient "No More alcohol"     INR normal, CR normal T Bili 2.1     Hepatitis C: No  Hepatitis B: No  Fatty liver: Yes  Encephalopathy: No  Post-hospital discharge: No  Symptoms: None    Primary hepatic manifestations:  Fatigue:Yes - improved  Edema:No  Ascites:No  Encephalopathy:No  Abdominal pain:No  GI bleeds: No  Pruritus:No  Weight Changes:No  Changes in Bowel habits: No  Muscle cramps:No    Risk factors for " liver disease:  No jaundice  No transfusions  No IVDU  Did not snort cocaine or similar agents  Did not live with anyone with hepatitis B or C  Sexual partner not tested  No hepatotoxic medications  No exposure to industrial toxins  Alcohol: Stopped 8 weeks , around May 25-June 1. Drank 6-pack /day and 2 mixed drinks in the evening.   Worked off shore as sub-sea , and [place].        ROS:  Constitutional: No fevers, chills, weight changes, fatigue  ENT: No allergies, nosebleeds,   CV: No chest pain  Pulm: No cough, shortness of breath  Ophtho: No vision changes  GI/Liver: see HPI  Derm: No rash, itching  Heme: No swollen glands, bruising  MSK: Right foot gout , end of the day swelling end of the day. - talk to PCP. May want to get a doppler on the leg.   : No dysuria, hematuria, decrease in urine output  Endo: No hot or cold intolerance  Neuro: No confusion, disorientation, difficulty with sleep, memory, concentration, syncope, seizure  Psych: No anxiety, depression    Medical History:  has no past medical history on file.    Surgical History:  has no past surgical history on file.    Family History: family history is not on file..     Social History:      Review of patient's allergies indicates:   Allergen Reactions    Penicillins Anaphylaxis         Objective Findings:    Vital Signs:  /86 (BP Location: Right arm, Patient Position: Sitting, BP Method: Large (Automatic))   Pulse 84   Temp 98.8 °F (37.1 °C) (Oral)   Ht 6' (1.829 m)   Wt 94.1 kg (207 lb 7.3 oz)   SpO2 99%   BMI 28.14 kg/m²   Body mass index is 28.14 kg/m².    Physical Exam:  General Appearance: Well appearing in no acute distress  Head:   Normocephalic, without obvious abnormality  Eyes:    No scleral icterus, EOMI  ENT: Neck supple, Lips, mucosa, and tongue normal; teeth and gums normal  Lungs: CTA bilaterally in anterior and posterior fields, no wheezes, no crackles.  Heart:  Regular rate and rhythm, S1, S2 normal,  no murmurs heard  Abdomen: Soft, non tender, non distended with positive bowel sounds in all four quadrants. No hepatosplenomegaly, ascites, or mass  Extremities: 2+ pulses, no clubbing, cyanosis or edema  Skin: No rash  Neurologic: CN II-XII intact, alert, oriented x 3. No asterixis      Labs:  Lab Results   Component Value Date    WBC 4.72 02/23/2023    HGB 14.0 02/23/2023    HCT 41.2 02/23/2023    PLT 58 (L) 02/23/2023    INR 1.4 (H) 02/23/2023    CREATININE 1.5 (H) 02/23/2023    BUN 21 02/23/2023    BILITOT 2.7 (H) 02/23/2023    ALT 26 02/23/2023    AST 45 (H) 02/23/2023    ALKPHOS 69 02/23/2023     (L) 02/23/2023    K 4.3 02/23/2023    CL 98 02/23/2023    CO2 24 02/23/2023    AFP 4.1 02/23/2023     MELD-Na score: 20 at 2/23/2023  9:10 AM  MELD score: 18 at 2/23/2023  9:10 AM  Calculated from:  Serum Creatinine: 1.5 mg/dL at 2/23/2023  9:10 AM  Serum Sodium: 134 mmol/L at 2/23/2023  9:10 AM  Total Bilirubin: 2.7 mg/dL at 2/23/2023  9:10 AM  INR(ratio): 1.4 at 2/23/2023  9:10 AM  Age: 62 years   Imaging:       Endoscopy:      Assessment:  No diagnosis found.   Well-compensated Cirrhosis, alcohol related, still drinks a 6-pack/week, could be autoimmune hepatitis see biopsy below  has positive 1:40 ASMA, ELVIA+, AMA+, IgG elevated 2280, elevated IgA 886. but markers are borderline elevated and IgG 3000 was after vaccine.   Liver biopsy on 5/5/2022 showed: (done in [place]).   Chronic hepatitis mild to moderate steatosis, moderate chronic inflammation, and stage 4 fibrosis.   Need to get slides for pathology review - get patient consent to have slides sent to us.     Viral hep markers neg, A1AT and cerulo normal.   MELD 7 days ago was 20.  PETH was 388 7 days ago.     I have told him his cirrhosis could be due alcohol, and he needs to stop all alcohol from hereon (3/2/23).    US 11/14/22: Mild hepatosplenomegaly, no focal lesion reported.       Recommendations:  -  Get patient's consent to get liver biopsy  slides for Pathology review and Path conf.    -  I have told him his cirrhosis could be due alcohol, and he needs to stop all alcohol from hereon (3/2/23).   -  Labs every 3 months next in 3 months before next visit:  CBC, CMP, PT INR, PETH  -  Low salt in the diet, avoid canned, bottled and processed foods.  -  Continue current meds  -  Ultrasound of abdomen and AFP every 6 months, next due in May 2023.   -  No alcohol, smoking, sedatives and meds with codeine.  -  Avoid high intake of Tylenol (more than 4 extra-strength (500 mg) pills in one day)  -  Call us if any bleeding, fevers, confusion, disorientation occur  -  Endoscopy: per GI MD locally  -  Transplant option discussed, will evaluate when MELD >15.  -  Return in 3 months.      No follow-ups on file.      Order summary:  No orders of the defined types were placed in this encounter.      Thank you so much for allowing me to participate in the care of Eulogio Ramin Aguero MD

## 2023-03-02 NOTE — TELEPHONE ENCOUNTER
Faxed a request to [facility] in [place], [place] to obtain patient's biopsy slides. Fax# [phone].

## 2023-04-25 DIAGNOSIS — Z00.6 RESEARCH STUDY PATIENT: Primary | ICD-10-CM

## 2023-05-09 ENCOUNTER — HOSPITAL ENCOUNTER (OUTPATIENT)
Dept: RADIOLOGY | Facility: HOSPITAL | Age: 63
Discharge: HOME | End: 2023-05-09
Attending: INTERNAL MEDICINE
Payer: COMMERCIAL

## 2023-05-09 ENCOUNTER — RESEARCH ENCOUNTER (OUTPATIENT)
Dept: RESEARCH | Facility: HOSPITAL | Age: 63
End: 2023-05-09
Payer: COMMERCIAL

## 2023-05-09 DIAGNOSIS — K74.69 OTHER CIRRHOSIS OF LIVER: ICD-10-CM

## 2023-05-09 PROCEDURE — 76705 US ABDOMEN LIMITED: ICD-10-PCS | Mod: 26,,, | Performed by: STUDENT IN AN ORGANIZED HEALTH CARE EDUCATION/TRAINING PROGRAM

## 2023-05-09 PROCEDURE — 76705 ECHO EXAM OF ABDOMEN: CPT | Mod: 26,,, | Performed by: STUDENT IN AN ORGANIZED HEALTH CARE EDUCATION/TRAINING PROGRAM

## 2023-05-09 PROCEDURE — 76705 ECHO EXAM OF ABDOMEN: CPT | Mod: TC

## 2023-05-09 NOTE — PROGRESS NOTES
RESEARCH STUDY SPECIMEN COLLECTION ENCOUNTER  ORGAN TRANSPLANT  [facility] TH[facility]SON    Study Title: Role of Tumor-Induced Immune Tolerance in the Patient Response to Locoregional Therapy: Implications in Assessment Risk of Hepatocellular Carcinoma Recurrence Following Liver Transplantation    IRB #: 2016.131.B    IRB Approval Date: 6/8/2016    : Edison Guerrero MD  Sub-investigator: Leo Morales, PhD    Patient Number: [id number]    In accordance with the study protocol, Research Lab orders were placed and follow-up specimens were collected on (date: 05/09/2023) in:  [facility] VNP: YES/NO: No  [facility] LABTX: YES/NO: No  [facility] IM: YES/NO: No  Other [facility] location: YES/NO: Yes   Location: [facility]    A  was used to transport blood specimens to [facility] for processing: YES/NO: Yes  Blood specimens were transported to [facility] for processing: YES/NO: No    Joelle Wesley  Admin Research- Liver Transplant

## 2023-05-10 ENCOUNTER — TELEPHONE (OUTPATIENT)
Dept: HEPATOLOGY | Facility: CLINIC | Age: 63
End: 2023-05-10
Payer: COMMERCIAL

## 2023-05-10 NOTE — TELEPHONE ENCOUNTER
----- Message from Tiffany Spain sent at 5/10/2023  1:55 PM CDT -----  Regarding: Speak to staff  Contact: robin  Pt wife Robin is Calling to speak to staff in regards to some slide that were suppose to be sent over for pt Biopsy in March doing last visit and signed a release, Requesting that Biopsy slides are resent to :  Please advise.  Requesting a call back.        [phone] ( wife)  [phone] (home)         [facility]    [phone]

## 2023-05-10 NOTE — TELEPHONE ENCOUNTER
Advised pt's wife per release of information at [facility] in [place], no record of pt having a liver bx. Release of information faxed anyway to [facility]. Pt's wife will go there tomorrow. ADALBERTO LINDO

## 2023-05-11 ENCOUNTER — TELEPHONE (OUTPATIENT)
Dept: HEPATOLOGY | Facility: CLINIC | Age: 63
End: 2023-05-11
Payer: COMMERCIAL

## 2023-05-11 NOTE — TELEPHONE ENCOUNTER
Release of information faxed to [phone] ( fax)   Attn: Pathology for release of liver bx slides. ADALBERTO LINDO

## 2023-05-11 NOTE — TELEPHONE ENCOUNTER
----- Message from Tiffany Spain sent at 5/11/2023  8:11 AM CDT -----  Regarding: Speak to staff  Contact: Lydia   Pt wife Lydia is Calling to speak to staff in regards to request for the slides of biopsy for the pt, It can be faxed to:      Please advise.  [phone] ( wife)  [phone] (home)         [phone] ( fax)   Attn: Pathology

## 2023-05-12 ENCOUNTER — PATIENT MESSAGE (OUTPATIENT)
Dept: HEPATOLOGY | Facility: CLINIC | Age: 63
End: 2023-05-12
Payer: COMMERCIAL

## 2023-05-16 ENCOUNTER — TELEPHONE (OUTPATIENT)
Dept: HEPATOLOGY | Facility: CLINIC | Age: 63
End: 2023-05-16
Payer: COMMERCIAL

## 2023-05-16 ENCOUNTER — PATIENT MESSAGE (OUTPATIENT)
Dept: HEPATOLOGY | Facility: CLINIC | Age: 63
End: 2023-05-16
Payer: COMMERCIAL

## 2023-05-16 NOTE — TELEPHONE ENCOUNTER
MELD-Na: 9 at 5/9/2023  7:16 AM  MELD: 9 at 5/9/2023  7:16 AM  Calculated from:  Serum Creatinine: 1.0 mg/dL at 5/9/2023  7:16 AM  Serum Sodium: 138 mmol/L (Using max of 137 mmol/L) at 5/9/2023  7:16 AM  Total Bilirubin: 1.3 mg/dL at 5/9/2023  7:16 AM  INR(ratio): 1.2 at 5/9/2023  7:16 AM     Please inform patient of MELD 9.   It is calculated in epic.  He can look for MELD calculator on [employer] and put in the values from his blood test results.     Mindy

## 2023-05-18 ENCOUNTER — LAB VISIT (OUTPATIENT)
Dept: LAB | Facility: HOSPITAL | Age: 63
End: 2023-05-18
Attending: INTERNAL MEDICINE
Payer: COMMERCIAL

## 2023-05-18 DIAGNOSIS — C22.9 MALIGNANT NEOPLASM OF LIVER: ICD-10-CM

## 2023-05-18 DIAGNOSIS — C22.9 MALIGNANT NEOPLASM OF LIVER: Primary | ICD-10-CM

## 2023-05-18 PROCEDURE — 88321 CONSLTJ&REPRT SLD PREP ELSWR: CPT | Performed by: PATHOLOGY

## 2023-05-18 PROCEDURE — 88321 PR  MICROSLIDE CONSULT: ICD-10-PCS | Mod: ,,, | Performed by: PATHOLOGY

## 2023-05-18 PROCEDURE — 88321 CONSLTJ&REPRT SLD PREP ELSWR: CPT | Mod: ,,, | Performed by: PATHOLOGY

## 2023-05-19 ENCOUNTER — TELEPHONE (OUTPATIENT)
Dept: HEPATOLOGY | Facility: CLINIC | Age: 63
End: 2023-05-19
Payer: COMMERCIAL

## 2023-05-19 ENCOUNTER — OFFICE VISIT (OUTPATIENT)
Dept: HEPATOLOGY | Facility: CLINIC | Age: 63
End: 2023-05-19
Payer: COMMERCIAL

## 2023-05-19 VITALS
OXYGEN SATURATION: 96 % | BODY MASS INDEX: 31.2 KG/M2 | WEIGHT: 230.38 LBS | TEMPERATURE: 98 F | DIASTOLIC BLOOD PRESSURE: 90 MMHG | SYSTOLIC BLOOD PRESSURE: 157 MMHG | HEART RATE: 74 BPM | HEIGHT: 72 IN

## 2023-05-19 DIAGNOSIS — K74.69 OTHER CIRRHOSIS OF LIVER: Primary | ICD-10-CM

## 2023-05-19 PROCEDURE — 3077F SYST BP >= 140 MM HG: CPT | Mod: CPTII,S$GLB,, | Performed by: INTERNAL MEDICINE

## 2023-05-19 PROCEDURE — 3008F BODY MASS INDEX DOCD: CPT | Mod: CPTII,S$GLB,, | Performed by: INTERNAL MEDICINE

## 2023-05-19 PROCEDURE — 1159F PR MEDICATION LIST DOCUMENTED IN MEDICAL RECORD: ICD-10-PCS | Mod: CPTII,S$GLB,, | Performed by: INTERNAL MEDICINE

## 2023-05-19 PROCEDURE — 3008F PR BODY MASS INDEX (BMI) DOCUMENTED: ICD-10-PCS | Mod: CPTII,S$GLB,, | Performed by: INTERNAL MEDICINE

## 2023-05-19 PROCEDURE — 99214 OFFICE O/P EST MOD 30 MIN: CPT | Mod: S$GLB,,, | Performed by: INTERNAL MEDICINE

## 2023-05-19 PROCEDURE — 99999 PR PBB SHADOW E&M-EST. PATIENT-LVL III: ICD-10-PCS | Mod: PBBFAC,,, | Performed by: INTERNAL MEDICINE

## 2023-05-19 PROCEDURE — 3077F PR MOST RECENT SYSTOLIC BLOOD PRESSURE >= 140 MM HG: ICD-10-PCS | Mod: CPTII,S$GLB,, | Performed by: INTERNAL MEDICINE

## 2023-05-19 PROCEDURE — 3080F DIAST BP >= 90 MM HG: CPT | Mod: CPTII,S$GLB,, | Performed by: INTERNAL MEDICINE

## 2023-05-19 PROCEDURE — 99214 PR OFFICE/OUTPT VISIT, EST, LEVL IV, 30-39 MIN: ICD-10-PCS | Mod: S$GLB,,, | Performed by: INTERNAL MEDICINE

## 2023-05-19 PROCEDURE — 99999 PR PBB SHADOW E&M-EST. PATIENT-LVL III: CPT | Mod: PBBFAC,,, | Performed by: INTERNAL MEDICINE

## 2023-05-19 PROCEDURE — 3080F PR MOST RECENT DIASTOLIC BLOOD PRESSURE >= 90 MM HG: ICD-10-PCS | Mod: CPTII,S$GLB,, | Performed by: INTERNAL MEDICINE

## 2023-05-19 PROCEDURE — 1159F MED LIST DOCD IN RCRD: CPT | Mod: CPTII,S$GLB,, | Performed by: INTERNAL MEDICINE

## 2023-05-19 NOTE — PATIENT INSTRUCTIONS
Recommendations:  -  Obtained liver biopsy slides for Pathology review and Path conf.  - submitted to pathology - conf note made. Will review next week.    -  I have told him his cirrhosis could be due alcohol, and he needs to stop all alcohol from hereon (3/2/23).   -  Labs every 3 months next in 3 months before next visit:  CBC, CMP, PT INR, PETH  -  Low salt in the diet, avoid canned, bottled and processed foods.  -  Continue current meds  -  Ultrasound of abdomen and AFP every 6 months, next due in May 2023.   -  No alcohol, smoking, sedatives and meds with codeine.  -  Avoid high intake of Tylenol (more than 4 extra-strength (500 mg) pills in one day)  -  Call us if any bleeding, fevers, confusion, disorientation occur  -  Endoscopy: per GI MD locally  -  Transplant option discussed, will evaluate when complication develops.  -  Return in 3 months.

## 2023-05-19 NOTE — TELEPHONE ENCOUNTER
[facility]ology Conference Note    Patient:  Eulogio Faustin  MRN:   [id number]  YOB: 1960  Date of Transplant:  N/A  Native Diagnosis:     Discussion/Plan:    Presenter: Hepatologist - June Aguero MD    Reason for presenting:  cirrhosis on outside biopsy.   Cirrhosis, chronic hepatitis, with steatosis. Has ELVIA +, AMA +, IgG 2280, IgA 886.  Is there any AIH/PBC (no biochemical evidence of autoimmune conditions.     Concerns for Pathologists:     Lab Results  WBC (K/uL)   Date Value   05/09/2023 5.06   02/23/2023 4.72   11/22/2022 4.92     PLT (K/uL)   Date Value   05/09/2023 104 (L)   02/23/2023 58 (L)   11/22/2022 88 (L)     INR (no units)   Date Value   05/09/2023 1.2   02/23/2023 1.4 (H)   11/22/2022 1.2     AST (U/L)   Date Value   05/09/2023 44 (H)     ALT (U/L)   Date Value   05/09/2023 35   02/23/2023 26   11/22/2022 28     BILITOT (mg/dL)   Date Value   05/09/2023 1.3 (H)   02/23/2023 2.7 (H)   11/22/2022 2.3 (H)     ALKPHOS (U/L)   Date Value   05/09/2023 86   02/23/2023 69   11/22/2022 59     CREATININE (mg/dL)   Date Value   05/09/2023 1.0   02/23/2023 1.5 (H)   11/22/2022 1.2     AFP (ng/mL)   Date Value   05/09/2023 3.7   02/23/2023 4.1   07/25/2022 4.3

## 2023-05-19 NOTE — PROGRESS NOTES
"   [facility] Follow-up Note    Reason for Consult:  There were no encounter diagnoses.    PCP: Primary Doctor No       HPI:  This is a 62 y.o. male here for evaluation of: Cirrhosis    61 yr old male, initially seen on 7/26/22:  found to have thrombocytopenia, elevated enzymes, fatty liver on U/S with hepatosplenomegaly.  Raised suspicion for cirrhosis.  Had h/o alcohol intake, Two beers a day and one mix drink at night.  Pt is working on decreasing intake and getting help from his PCP.  Futher Eval of elevated enzymes revealed, ELVIA+, AMA+, IgG elevated 2280, elevated IgA 886.    H/o renal failure - saw nephrology, improved, and was released from that service.     Fibroscan showed S0 steatosis, F4 fibrosis c/w cirrhosis.      Liver biopsy on 5/5/2022 showed: (done in [place], [place]).   Chronic hepatitis mild to moderate steatosis, moderate chronic inflammation, and stage 4 fibrosis.   Need to get slides for pathology review - get patient consent to have slides sent to us.     Labs 2/23/23:  PETH 388, MELD 20.     MELD-Na: 9 at 5/9/2023  7:16 AM  MELD: 9 at 5/9/2023  7:16 AM  Calculated from:  Serum Creatinine: 1.0 mg/dL at 5/9/2023  7:16 AM  Serum Sodium: 138 mmol/L (Using max of 137 mmol/L) at 5/9/2023  7:16 AM  Total Bilirubin: 1.3 mg/dL at 5/9/2023  7:16 AM  INR(ratio): 1.2 at 5/9/2023  7:16 AM       EGD 7/15/22: no varices.     Pt was informed he will have to maintain total sobriety prior to any consideration for transplant. Pt acknowledged this and is working on it.     I have told patient "No More alcohol"     INR normal, CR normal T Bili 2.1     Hepatitis C: No  Hepatitis B: No  Fatty liver: Yes  Encephalopathy: No  Post-hospital discharge: No  Symptoms: None    Primary hepatic manifestations:  Fatigue:Yes - improved  Edema:No  Ascites:No  Encephalopathy:No  Abdominal pain:No  GI bleeds: No  Pruritus:No  Weight Changes:No  Changes in Bowel habits: No  Muscle cramps:No    Risk factors for liver " disease:  No jaundice  No transfusions  No IVDU  Did not snort cocaine or similar agents  Did not live with anyone with hepatitis B or C  Sexual partner not tested  No hepatotoxic medications  No exposure to industrial toxins  Alcohol: Stopped 8 weeks , around May 25-June 1. Drank 6-pack /day and 2 mixed drinks in the evening.   [place] , [place]        ROS:  Constitutional: No fevers, chills, weight changes, fatigue  ENT: No allergies, nosebleeds,   CV: No chest pain  Pulm: No cough, shortness of breath  Ophtho: No vision changes  GI/Liver: see HPI  Derm: No rash, itching  Heme: No swollen glands, bruising  MSK: Right foot gout , end of the day swelling end of the day. - talk to PCP. May want to get a doppler on the leg.   : No dysuria, hematuria, decrease in urine output  Endo: No hot or cold intolerance  Neuro: No confusion, disorientation, difficulty with sleep, memory, concentration, syncope, seizure  Psych: No anxiety, depression    Medical History:  has no past medical history on file.    Surgical History:  has no past surgical history on file.    Family History: family history is not on file..     Social History:      Review of patient's allergies indicates:   Allergen Reactions    Penicillins Anaphylaxis         Objective Findings:    Vital Signs:  BP (!) 157/90 (BP Location: Right arm, Patient Position: Sitting, BP Method: Large (Automatic))   Pulse 74   Temp 98 °F (36.7 °C) (Oral)   Ht 6' (1.829 m)   Wt 104.5 kg (230 lb 6.1 oz)   SpO2 96% Comment: ra  BMI 31.25 kg/m²   Body mass index is 31.25 kg/m².    Physical Exam:  General Appearance: Well appearing in no acute distress  Head:   Normocephalic, without obvious abnormality  Eyes:    No scleral icterus, EOMI  ENT: Neck supple, Lips, mucosa, and tongue normal; teeth and gums normal  Lungs: CTA bilaterally in anterior and posterior fields, no wheezes, no crackles.  Heart:  Regular rate and rhythm, S1, S2  normal, no murmurs heard  Abdomen: Soft, non tender, non distended with positive bowel sounds in all four quadrants. No hepatosplenomegaly, ascites, or mass  Extremities: 2+ pulses, no clubbing, cyanosis or edema  Skin: No rash  Neurologic: CN II-XII intact, alert, oriented x 3. No asterixis      Labs:  Lab Results   Component Value Date    WBC 5.06 05/09/2023    HGB 13.5 (L) 05/09/2023    HCT 40.2 05/09/2023     (L) 05/09/2023    INR 1.2 05/09/2023    CREATININE 1.0 05/09/2023    BUN 16 05/09/2023    BILITOT 1.3 (H) 05/09/2023    ALT 35 05/09/2023    AST 44 (H) 05/09/2023    ALKPHOS 86 05/09/2023     05/09/2023    K 4.6 05/09/2023     05/09/2023    CO2 27 05/09/2023    AFP 3.7 05/09/2023     MELD-Na: 9 at 5/9/2023  7:16 AM  MELD: 9 at 5/9/2023  7:16 AM  Calculated from:  Serum Creatinine: 1.0 mg/dL at 5/9/2023  7:16 AM  Serum Sodium: 138 mmol/L (Using max of 137 mmol/L) at 5/9/2023  7:16 AM  Total Bilirubin: 1.3 mg/dL at 5/9/2023  7:16 AM  INR(ratio): 1.2 at 5/9/2023  7:16 AM   Imaging:       Endoscopy:      Assessment:  No diagnosis found.   Well-compensated Cirrhosis, alcohol related, still drinks a 6-pack/week, could be autoimmune hepatitis see biopsy below  has positive 1:40 ASMA, ELVIA+, AMA+, IgG elevated 2280, elevated IgA 886. but markers are borderline elevated and IgG 3000 was after vaccine.   Liver biopsy on 5/5/2022 showed: (done in [place]).   Chronic hepatitis mild to moderate steatosis, moderate chronic inflammation, and stage 4 fibrosis.   Need to get slides for pathology review - get patient consent to have slides sent to us.     PSA has been elevated for 10 +years.  Two prostate biopsies were negative for malignancy -     Viral hep markers neg, A1AT and cerulo normal.   MELD 7 days ago was 20.  PETH was 388 7 days ago.     I have told him his cirrhosis could be due alcohol, and he needs to stop all alcohol from hereon (3/2/23).    US 11/14/22: Mild hepatosplenomegaly, no focal  lesion reported.       Recommendations:  -  Obtained liver biopsy slides for Pathology review and Path conf.  - submitted to pathology - conf note made. Will review next week.    -  I have told him his cirrhosis could be due alcohol, and he needs to stop all alcohol from hereon (3/2/23).   -  Labs every 3 months next in 3 months before next visit:  CBC, CMP, PT INR, PETH  -  Low salt in the diet, avoid canned, bottled and processed foods.  -  Continue current meds  -  Ultrasound of abdomen and AFP every 6 months, next due in May 2023.   -  No alcohol, smoking, sedatives and meds with codeine.  -  Avoid high intake of Tylenol (more than 4 extra-strength (500 mg) pills in one day)  -  Call us if any bleeding, fevers, confusion, disorientation occur  -  Endoscopy: per GI MD locally  -  Transplant option discussed, will evaluate when MELD >15.  -  Return in 3 months.      Follow up in about 3 months (around 8/19/2023).      Order summary:  No orders of the defined types were placed in this encounter.      Thank you so much for allowing me to participate in the care of Eulogio Aguero MD

## 2023-05-23 ENCOUNTER — TELEPHONE (OUTPATIENT)
Dept: HEPATOLOGY | Facility: CLINIC | Age: 63
End: 2023-05-23
Payer: COMMERCIAL

## 2023-05-23 LAB
COMMENT: NORMAL
FINAL PATHOLOGIC DIAGNOSIS: NORMAL
Lab: NORMAL

## 2023-05-23 NOTE — TELEPHONE ENCOUNTER
Advised pt's wife, release for liver bx slides sent to [facility] and a CT scan was sent back to us. Per release of information at [facility], no liver bx on record. Pt states she spoke with someone in pathology and they verified he had a bx and would send slides. We rec'd a CT disc. Pt states she will go back to the hospital and touch base with us later/ ADALBERTO LINDO

## 2023-05-23 NOTE — TELEPHONE ENCOUNTER
"----- Message from Job Dunn sent at 5/23/2023  8:37 AM CDT -----  Consult/Advisory:          Name Of Caller: GIUSEPPE DUKES (Spouse)       Contact Preference?: [phone] (Mobile)      Provider Name: Laci      Does patient feel the need to be seen today? No      What is the nature of the call?: Returning call to Jt about slides          Additional Notes:  "Thank you for all that you do for our patients"       "

## 2023-05-24 ENCOUNTER — TELEPHONE (OUTPATIENT)
Dept: HEPATOLOGY | Facility: CLINIC | Age: 63
End: 2023-05-24
Payer: COMMERCIAL

## 2023-05-24 NOTE — TELEPHONE ENCOUNTER
----- Message from Keena Cleaning sent at 5/24/2023  8:25 AM CDT -----  Regarding: Bx slides  [facility] calling on Eulogio Faustin MRN [id number] regarding the biopsy slides - she has a tracker says was delivered Monday and she received request again yesterday for the slides - she says was a CT scan guided biopsy and should have been actual slides and they have a tracker receipt that it was delivered last Monday     Please reach out to Leticia @#  [phone]

## 2023-05-25 ENCOUNTER — PATIENT MESSAGE (OUTPATIENT)
Dept: HEPATOLOGY | Facility: CLINIC | Age: 63
End: 2023-05-25
Payer: COMMERCIAL

## 2023-06-11 ENCOUNTER — TELEPHONE (OUTPATIENT)
Dept: TRANSPLANT | Facility: CLINIC | Age: 63
End: 2023-06-11
Payer: COMMERCIAL

## 2023-06-11 NOTE — TELEPHONE ENCOUNTER
Please inform patient:  Liver biopsy slides were reviewed, and they were interpreted as showing:    micronodular type cirrhosis  -MCCORMACK/GUILLERMINA    Fatty liver with inflammation, resulting in small nodules in the liver and cirrhosis. This can be caused by alcohol consumption or by metabolic syndrome which is caused by various metabolic problems occurring simultaneously and include diabetes, high cholesterol and triglycerides (fat), hypertension, hypothyroidism, family history of fatty liver.     Due to positive alcohol test on 2/23/23, recommend stopping alcohol altogether.  Otherwise, the cirrhosis will progress and liver can fail.      June Aguero MD

## 2023-08-02 ENCOUNTER — TELEPHONE (OUTPATIENT)
Dept: HEPATOLOGY | Facility: CLINIC | Age: 63
End: 2023-08-02
Payer: COMMERCIAL

## 2023-08-02 ENCOUNTER — PATIENT MESSAGE (OUTPATIENT)
Dept: HEPATOLOGY | Facility: CLINIC | Age: 63
End: 2023-08-02
Payer: COMMERCIAL

## 2023-08-02 NOTE — TELEPHONE ENCOUNTER
Pt will keep the in person in the case the labs are questionable or else would possibly like to be seen in November after the ultrasound

## 2023-08-15 ENCOUNTER — LAB VISIT (OUTPATIENT)
Dept: LAB | Facility: HOSPITAL | Age: 63
End: 2023-08-15
Attending: INTERNAL MEDICINE
Payer: COMMERCIAL

## 2023-08-15 DIAGNOSIS — K74.69 OTHER CIRRHOSIS OF LIVER: ICD-10-CM

## 2023-08-15 LAB
ALBUMIN SERPL BCP-MCNC: 3.8 G/DL (ref 3.5–5.2)
ALP SERPL-CCNC: 68 U/L (ref 55–135)
ALT SERPL W/O P-5'-P-CCNC: 31 U/L (ref 10–44)
ANION GAP SERPL CALC-SCNC: 13 MMOL/L (ref 8–16)
AST SERPL-CCNC: 46 U/L (ref 10–40)
BILIRUB SERPL-MCNC: 1.3 MG/DL (ref 0.1–1)
BUN SERPL-MCNC: 16 MG/DL (ref 8–23)
CALCIUM SERPL-MCNC: 9.4 MG/DL (ref 8.7–10.5)
CHLORIDE SERPL-SCNC: 99 MMOL/L (ref 95–110)
CO2 SERPL-SCNC: 23 MMOL/L (ref 23–29)
CREAT SERPL-MCNC: 1.6 MG/DL (ref 0.5–1.4)
EST. GFR  (NO RACE VARIABLE): 48.7 ML/MIN/1.73 M^2
GLUCOSE SERPL-MCNC: 104 MG/DL (ref 70–110)
INR PPP: 1.3 (ref 0.8–1.2)
POTASSIUM SERPL-SCNC: 5 MMOL/L (ref 3.5–5.1)
PROT SERPL-MCNC: 8 G/DL (ref 6–8.4)
PROTHROMBIN TIME: 13.9 SEC (ref 9–12.5)
SODIUM SERPL-SCNC: 135 MMOL/L (ref 136–145)

## 2023-08-15 PROCEDURE — 80321 ALCOHOLS BIOMARKERS 1OR 2: CPT | Performed by: INTERNAL MEDICINE

## 2023-08-15 PROCEDURE — 80053 COMPREHEN METABOLIC PANEL: CPT | Performed by: INTERNAL MEDICINE

## 2023-08-15 PROCEDURE — 85025 COMPLETE CBC W/AUTO DIFF WBC: CPT | Performed by: INTERNAL MEDICINE

## 2023-08-15 PROCEDURE — 85610 PROTHROMBIN TIME: CPT | Performed by: INTERNAL MEDICINE

## 2023-08-15 PROCEDURE — 82105 ALPHA-FETOPROTEIN SERUM: CPT | Performed by: INTERNAL MEDICINE

## 2023-08-15 PROCEDURE — 36415 COLL VENOUS BLD VENIPUNCTURE: CPT | Mod: PO | Performed by: INTERNAL MEDICINE

## 2023-08-16 LAB
AFP SERPL-MCNC: 3.1 NG/ML (ref 0–8.4)
BASOPHILS # BLD AUTO: 0.05 K/UL (ref 0–0.2)
BASOPHILS NFR BLD: 1 % (ref 0–1.9)
DIFFERENTIAL METHOD: ABNORMAL
EOSINOPHIL # BLD AUTO: 0.2 K/UL (ref 0–0.5)
EOSINOPHIL NFR BLD: 3.4 % (ref 0–8)
ERYTHROCYTE [DISTWIDTH] IN BLOOD BY AUTOMATED COUNT: 13.6 % (ref 11.5–14.5)
HCT VFR BLD AUTO: 40.9 % (ref 40–54)
HGB BLD-MCNC: 13.6 G/DL (ref 14–18)
IMM GRANULOCYTES # BLD AUTO: 0.01 K/UL (ref 0–0.04)
IMM GRANULOCYTES NFR BLD AUTO: 0.2 % (ref 0–0.5)
LYMPHOCYTES # BLD AUTO: 1.3 K/UL (ref 1–4.8)
LYMPHOCYTES NFR BLD: 25.1 % (ref 18–48)
MCH RBC QN AUTO: 36.1 PG (ref 27–31)
MCHC RBC AUTO-ENTMCNC: 33.3 G/DL (ref 32–36)
MCV RBC AUTO: 109 FL (ref 82–98)
MONOCYTES # BLD AUTO: 0.5 K/UL (ref 0.3–1)
MONOCYTES NFR BLD: 9.5 % (ref 4–15)
NEUTROPHILS # BLD AUTO: 3.1 K/UL (ref 1.8–7.7)
NEUTROPHILS NFR BLD: 60.8 % (ref 38–73)
NRBC BLD-RTO: 0 /100 WBC
PLATELET # BLD AUTO: 66 K/UL (ref 150–450)
PMV BLD AUTO: 11.3 FL (ref 9.2–12.9)
RBC # BLD AUTO: 3.77 M/UL (ref 4.6–6.2)
WBC # BLD AUTO: 5.06 K/UL (ref 3.9–12.7)

## 2023-08-17 ENCOUNTER — PATIENT MESSAGE (OUTPATIENT)
Dept: HEPATOLOGY | Facility: CLINIC | Age: 63
End: 2023-08-17
Payer: COMMERCIAL

## 2023-08-18 LAB
CLINICAL BIOCHEMIST REVIEW: NORMAL
PLPETH BLD-MCNC: 520 NG/ML
POPETH BLD-MCNC: 616 NG/ML

## 2023-08-22 ENCOUNTER — TELEPHONE (OUTPATIENT)
Dept: HEPATOLOGY | Facility: CLINIC | Age: 63
End: 2023-08-22
Payer: COMMERCIAL

## 2023-08-22 NOTE — TELEPHONE ENCOUNTER
----- Message -----  From: June Aguero MD  Sent: 8/21/2023  10:13 AM CDT  To: Laci Watkins Staff     Please inform patient:   Alcohol level is very high 616, normal being <10.  This could be damaging to the liver and other organs. Recommend stopping alcohol, and remain off of alcohol.

## 2023-08-22 NOTE — TELEPHONE ENCOUNTER
----- Message from Jt Mcallister MA sent at 8/21/2023  4:13 PM CDT -----    ----- Message -----  From: June Aguero MD  Sent: 8/21/2023  10:13 AM CDT  To: Laci Watkins Staff    Please inform patient:   Alcohol level is very high 616, normal being <10.  This could be damaging to the liver and other organs. Recommend stopping alcohol, and remain off of alcohol.

## 2023-08-25 ENCOUNTER — OFFICE VISIT (OUTPATIENT)
Dept: HEPATOLOGY | Facility: CLINIC | Age: 63
End: 2023-08-25
Payer: COMMERCIAL

## 2023-08-25 VITALS
HEIGHT: 72 IN | DIASTOLIC BLOOD PRESSURE: 81 MMHG | HEART RATE: 60 BPM | WEIGHT: 224.44 LBS | BODY MASS INDEX: 30.4 KG/M2 | OXYGEN SATURATION: 98 % | SYSTOLIC BLOOD PRESSURE: 156 MMHG | RESPIRATION RATE: 18 BRPM | TEMPERATURE: 97 F

## 2023-08-25 DIAGNOSIS — K70.30 ALCOHOLIC CIRRHOSIS OF LIVER WITHOUT ASCITES: Primary | ICD-10-CM

## 2023-08-25 PROCEDURE — 99999 PR PBB SHADOW E&M-EST. PATIENT-LVL IV: CPT | Mod: PBBFAC,,, | Performed by: INTERNAL MEDICINE

## 2023-08-25 PROCEDURE — 3079F DIAST BP 80-89 MM HG: CPT | Mod: CPTII,S$GLB,, | Performed by: INTERNAL MEDICINE

## 2023-08-25 PROCEDURE — 3008F PR BODY MASS INDEX (BMI) DOCUMENTED: ICD-10-PCS | Mod: CPTII,S$GLB,, | Performed by: INTERNAL MEDICINE

## 2023-08-25 PROCEDURE — 99213 PR OFFICE/OUTPT VISIT, EST, LEVL III, 20-29 MIN: ICD-10-PCS | Mod: S$GLB,,, | Performed by: INTERNAL MEDICINE

## 2023-08-25 PROCEDURE — 3077F SYST BP >= 140 MM HG: CPT | Mod: CPTII,S$GLB,, | Performed by: INTERNAL MEDICINE

## 2023-08-25 PROCEDURE — 3079F PR MOST RECENT DIASTOLIC BLOOD PRESSURE 80-89 MM HG: ICD-10-PCS | Mod: CPTII,S$GLB,, | Performed by: INTERNAL MEDICINE

## 2023-08-25 PROCEDURE — 3008F BODY MASS INDEX DOCD: CPT | Mod: CPTII,S$GLB,, | Performed by: INTERNAL MEDICINE

## 2023-08-25 PROCEDURE — 1159F PR MEDICATION LIST DOCUMENTED IN MEDICAL RECORD: ICD-10-PCS | Mod: CPTII,S$GLB,, | Performed by: INTERNAL MEDICINE

## 2023-08-25 PROCEDURE — 99213 OFFICE O/P EST LOW 20 MIN: CPT | Mod: S$GLB,,, | Performed by: INTERNAL MEDICINE

## 2023-08-25 PROCEDURE — 3077F PR MOST RECENT SYSTOLIC BLOOD PRESSURE >= 140 MM HG: ICD-10-PCS | Mod: CPTII,S$GLB,, | Performed by: INTERNAL MEDICINE

## 2023-08-25 PROCEDURE — 99999 PR PBB SHADOW E&M-EST. PATIENT-LVL IV: ICD-10-PCS | Mod: PBBFAC,,, | Performed by: INTERNAL MEDICINE

## 2023-08-25 PROCEDURE — 1159F MED LIST DOCD IN RCRD: CPT | Mod: CPTII,S$GLB,, | Performed by: INTERNAL MEDICINE

## 2023-08-25 RX ORDER — DISULFIRAM 250 MG/1
TABLET ORAL
COMMUNITY
Start: 2022-12-27

## 2023-08-25 RX ORDER — AMMONIUM LACTATE 12 G/100G
CREAM TOPICAL 2 TIMES DAILY PRN
COMMUNITY
Start: 2023-08-21

## 2023-08-25 NOTE — Clinical Note
Recommendations: -  Obtained liver biopsy slides for Pathology review and Path conf.  - submitted to pathology - conf note made. Will review next week.   -  I have told him his cirrhosis could be due alcohol, and he needs to stop all alcohol from hereon (3/2/23).  -  Labs every 3 months next in 3 months before next visit:  CBC, CMP, PT INR, PETH -  Low salt in the diet, avoid canned, bottled and processed foods. -  Continue current meds -  Ultrasound of abdomen and AFP every 6 months, next due in May 2023.  -  No alcohol, smoking, sedatives and meds with codeine. -  Avoid high intake of Tylenol (more than 4 extra-strength (500 mg) pills in one day) -  Call us if any bleeding, fevers, confusion, disorientation occur -  Endoscopy: per GI MD locally -  Transplant option discussed, will evaluate when MELD >15. -  Return in 3 months.

## 2023-08-25 NOTE — PROGRESS NOTES
[facility] Follow-up Note    Reason for Consult:  There were no encounter diagnoses.    PCP: Lillie, Primary Doctor       HPI:  This is a 62 y.o. male here for evaluation of: Cirrhosis    61 yr old male, initially seen on 7/26/22:  found to have thrombocytopenia, elevated enzymes, fatty liver on U/S with hepatosplenomegaly.  Raised suspicion for cirrhosis.  Had h/o alcohol intake, Two beers a day and one mix drink at night.  Pt is working on decreasing intake and getting help from his PCP.  Futher Eval of elevated enzymes revealed, ELVIA+, AMA+, IgG elevated 2280, elevated IgA 886.  PETH test positive multiple times.     H/o renal failure - saw nephrology, improved, and was released from that service.     Fibroscan showed S0 steatosis, F4 fibrosis c/w cirrhosis.      Liver biopsy on 5/5/2022 showed: (done in [place], [place]).   Chronic hepatitis mild to moderate steatosis, moderate chronic inflammation, and stage 4 fibrosis.   Need to get slides for pathology review - get patient consent to have slides sent to us.       Labs 2/23/23:  PETH 388, MELD 20.       Interval History:8/25/23  Had anxiety past few weeks about various things in his life lately, including elevated PSA, helping other people, and not being appreciated, all have led him to drink alcohol.  Had restarted antabuse.      Labs 8/15/23:   Creat 1.6, otherwise LFTs improved.      MELD 3.0: 16 at 8/15/2023  9:38 AM  MELD-Na: 17 at 8/15/2023  9:38 AM  Calculated from:  Serum Creatinine: 1.6 mg/dL at 8/15/2023  9:38 AM  Serum Sodium: 135 mmol/L at 8/15/2023  9:38 AM  Total Bilirubin: 1.3 mg/dL at 8/15/2023  9:38 AM  Serum Albumin: 3.8 g/dL (Using max of 3.5 g/dL) at 8/15/2023  9:38 AM  INR(ratio): 1.3 at 8/15/2023  9:38 AM  Age at listing (hypothetical): 62 years  Sex: Male at 8/15/2023  9:38 AM     EGD 7/15/22: no varices.     Pt was informed he will have to maintain total sobriety prior to any consideration for transplant. Pt acknowledged this and  "is working on it.     I have told patient "No More alcohol"     INR normal, CR normal T Bili 2.1     Hepatitis C: No  Hepatitis B: No  Fatty liver: Yes  Encephalopathy: No  Post-hospital discharge: No  Symptoms: None      Primary hepatic manifestations:  Fatigue:Yes - improved  Edema:No  Ascites:No  Encephalopathy:No  Abdominal pain:No  GI bleeds: No  Pruritus:No  Weight Changes:No  Changes in Bowel habits: No  Muscle cramps:No      Risk factors for liver disease:  No jaundice  No transfusions  No IVDU  Did not snort cocaine or similar agents  Did not live with anyone with hepatitis B or C  Sexual partner not tested  No hepatotoxic medications  No exposure to industrial toxins  Alcohol: Stopped 8 weeks , around May 25-June 1. Drank 6-pack /day and 2 mixed drinks in the evening.   [place] , and [place]      ROS:  Constitutional: No fevers, chills, weight changes, fatigue  ENT: No allergies, nosebleeds,   CV: No chest pain  Pulm: No cough, shortness of breath  Ophtho: No vision changes  GI/Liver: see HPI  Derm: No rash, itching  Heme: No swollen glands, bruising  MSK: Right foot gout , end of the day swelling end of the day. - talk to PCP. May want to get a doppler on the leg.   : No dysuria, hematuria, decrease in urine output  Endo: No hot or cold intolerance  Neuro: No confusion, disorientation, difficulty with sleep, memory, concentration, syncope, seizure  Psych: No anxiety, depression    Medical History:  has no past medical history on file.    Surgical History:  has no past surgical history on file.    Family History: family history is not on file..     Social History:      Review of patient's allergies indicates:   Allergen Reactions    Penicillins Anaphylaxis         Objective Findings:    Vital Signs:  BP (!) 156/81 (BP Location: Left arm, Patient Position: Sitting, BP Method: Medium (Automatic))   Pulse 60   Temp 97.3 °F (36.3 °C) (Oral)   Resp 18   Ht 6' (1.829 " m)   Wt 101.8 kg (224 lb 6.9 oz)   SpO2 98%   BMI 30.44 kg/m²   Body mass index is 30.44 kg/m².    Physical Exam:  General Appearance: Well appearing in no acute distress  Head:   Normocephalic, without obvious abnormality  Eyes:    No scleral icterus, EOMI  ENT: Neck supple, Lips, mucosa, and tongue normal; teeth and gums normal  Lungs: CTA bilaterally in anterior and posterior fields, no wheezes, no crackles.  Heart:  Regular rate and rhythm, S1, S2 normal, no murmurs heard  Abdomen: Soft, non tender, non distended with positive bowel sounds in all four quadrants. No hepatosplenomegaly, ascites, or mass  Extremities: 2+ pulses, no clubbing, cyanosis or edema  Skin: No rash  Neurologic: CN II-XII intact, alert, oriented x 3. No asterixis      Labs:  Lab Results   Component Value Date    WBC 5.06 08/15/2023    HGB 13.6 (L) 08/15/2023    HCT 40.9 08/15/2023    PLT 66 (L) 08/15/2023    INR 1.3 (H) 08/15/2023    CREATININE 1.6 (H) 08/15/2023    BUN 16 08/15/2023    BILITOT 1.3 (H) 08/15/2023    ALT 31 08/15/2023    AST 46 (H) 08/15/2023    ALKPHOS 68 08/15/2023     (L) 08/15/2023    K 5.0 08/15/2023    CL 99 08/15/2023    CO2 23 08/15/2023    AFP 3.1 08/15/2023     MELD 3.0: 16 at 8/15/2023  9:38 AM  MELD-Na: 17 at 8/15/2023  9:38 AM  Calculated from:  Serum Creatinine: 1.6 mg/dL at 8/15/2023  9:38 AM  Serum Sodium: 135 mmol/L at 8/15/2023  9:38 AM  Total Bilirubin: 1.3 mg/dL at 8/15/2023  9:38 AM  Serum Albumin: 3.8 g/dL (Using max of 3.5 g/dL) at 8/15/2023  9:38 AM  INR(ratio): 1.3 at 8/15/2023  9:38 AM  Age at listing (hypothetical): 62 years  Sex: Male at 8/15/2023  9:38 AM     Imaging:       Endoscopy:      Assessment:  No diagnosis found.   Well-compensated Cirrhosis, alcohol related, still drinks a 6-pack/week, could be autoimmune hepatitis see biopsy below  has positive 1:40 ASMA, ELVIA+, AMA+, IgG elevated 2280, elevated IgA 886. but markers are borderline elevated and IgG 3000 was after vaccine.    Liver biopsy on 5/5/2022 showed: (done in [place], [place]).   Chronic hepatitis mild to moderate steatosis, moderate chronic inflammation, and stage 4 fibrosis.   Need to get slides for pathology review - get patient consent to have slides sent to us.     PSA has been elevated for 10 +years.  Two prostate biopsies were negative for malignancy -     Viral hep markers neg, A1AT and cerulo normal.   MELD 7 days ago was 20.  PETH was 388 7 days ago.     I have told him his cirrhosis could be due alcohol, and he needs to stop all alcohol from hereon (3/2/23).    US 11/14/22: Mild hepatosplenomegaly, no focal lesion reported.       Recommendations:  -  Obtained liver biopsy slides for Pathology review and Path conf.  - submitted to pathology - conf note made. Will review next week.    -  I have told him his cirrhosis could be due alcohol, and he needs to stop all alcohol from hereon (3/2/23).   -  Labs every 3 months next in 3 months before next visit:  CBC, CMP, PT INR, PETH  -  Low salt in the diet, avoid canned, bottled and processed foods.  -  Continue current meds  -  Ultrasound of abdomen and AFP every 6 months, next due in May 2023.   -  No alcohol, smoking, sedatives and meds with codeine.  -  Avoid high intake of Tylenol (more than 4 extra-strength (500 mg) pills in one day)  -  Call us if any bleeding, fevers, confusion, disorientation occur  -  Endoscopy: per GI MD locally  -  Transplant option discussed, will evaluate when MELD >15.  -  Return in 3 months.      No follow-ups on file.      Order summary:  No orders of the defined types were placed in this encounter.      Thank you so much for allowing me to participate in the care of Eulogio Aguero MD

## 2023-11-14 ENCOUNTER — HOSPITAL ENCOUNTER (OUTPATIENT)
Dept: RADIOLOGY | Facility: HOSPITAL | Age: 63
Discharge: HOME | End: 2023-11-14
Attending: INTERNAL MEDICINE
Payer: COMMERCIAL

## 2023-11-14 DIAGNOSIS — K74.69 OTHER CIRRHOSIS OF LIVER: ICD-10-CM

## 2023-11-14 PROCEDURE — 76705 ECHO EXAM OF ABDOMEN: CPT | Mod: 26,,, | Performed by: STUDENT IN AN ORGANIZED HEALTH CARE EDUCATION/TRAINING PROGRAM

## 2023-11-14 PROCEDURE — 76705 US ABDOMEN LIMITED: ICD-10-PCS | Mod: 26,,, | Performed by: STUDENT IN AN ORGANIZED HEALTH CARE EDUCATION/TRAINING PROGRAM

## 2023-11-14 PROCEDURE — 76705 ECHO EXAM OF ABDOMEN: CPT | Mod: TC

## 2023-11-17 ENCOUNTER — PATIENT MESSAGE (OUTPATIENT)
Dept: HEPATOLOGY | Facility: CLINIC | Age: 63
End: 2023-11-17
Payer: COMMERCIAL

## 2023-11-27 ENCOUNTER — OFFICE VISIT (OUTPATIENT)
Dept: HEPATOLOGY | Facility: CLINIC | Age: 63
End: 2023-11-27
Payer: COMMERCIAL

## 2023-11-27 VITALS
SYSTOLIC BLOOD PRESSURE: 125 MMHG | OXYGEN SATURATION: 98 % | BODY MASS INDEX: 31.95 KG/M2 | DIASTOLIC BLOOD PRESSURE: 71 MMHG | HEIGHT: 72 IN | HEART RATE: 70 BPM | WEIGHT: 235.88 LBS | RESPIRATION RATE: 18 BRPM

## 2023-11-27 DIAGNOSIS — F10.90 ALCOHOL INTAKE ABOVE RECOMMENDED SENSIBLE LIMITS: Primary | ICD-10-CM

## 2023-11-27 PROCEDURE — 99999 PR PBB SHADOW E&M-EST. PATIENT-LVL III: CPT | Mod: PBBFAC,,, | Performed by: INTERNAL MEDICINE

## 2023-11-27 PROCEDURE — 3074F SYST BP LT 130 MM HG: CPT | Mod: CPTII,S$GLB,, | Performed by: INTERNAL MEDICINE

## 2023-11-27 PROCEDURE — 99214 OFFICE O/P EST MOD 30 MIN: CPT | Mod: S$GLB,,, | Performed by: INTERNAL MEDICINE

## 2023-11-27 PROCEDURE — 99999 PR PBB SHADOW E&M-EST. PATIENT-LVL III: ICD-10-PCS | Mod: PBBFAC,,, | Performed by: INTERNAL MEDICINE

## 2023-11-27 PROCEDURE — 3008F BODY MASS INDEX DOCD: CPT | Mod: CPTII,S$GLB,, | Performed by: INTERNAL MEDICINE

## 2023-11-27 PROCEDURE — 3078F PR MOST RECENT DIASTOLIC BLOOD PRESSURE < 80 MM HG: ICD-10-PCS | Mod: CPTII,S$GLB,, | Performed by: INTERNAL MEDICINE

## 2023-11-27 PROCEDURE — 99214 PR OFFICE/OUTPT VISIT, EST, LEVL IV, 30-39 MIN: ICD-10-PCS | Mod: S$GLB,,, | Performed by: INTERNAL MEDICINE

## 2023-11-27 PROCEDURE — 1159F PR MEDICATION LIST DOCUMENTED IN MEDICAL RECORD: ICD-10-PCS | Mod: CPTII,S$GLB,, | Performed by: INTERNAL MEDICINE

## 2023-11-27 PROCEDURE — 3074F PR MOST RECENT SYSTOLIC BLOOD PRESSURE < 130 MM HG: ICD-10-PCS | Mod: CPTII,S$GLB,, | Performed by: INTERNAL MEDICINE

## 2023-11-27 PROCEDURE — 1159F MED LIST DOCD IN RCRD: CPT | Mod: CPTII,S$GLB,, | Performed by: INTERNAL MEDICINE

## 2023-11-27 PROCEDURE — 3078F DIAST BP <80 MM HG: CPT | Mod: CPTII,S$GLB,, | Performed by: INTERNAL MEDICINE

## 2023-11-27 PROCEDURE — 3008F PR BODY MASS INDEX (BMI) DOCUMENTED: ICD-10-PCS | Mod: CPTII,S$GLB,, | Performed by: INTERNAL MEDICINE

## 2023-11-27 NOTE — PROGRESS NOTES
"   [facility] Follow-up Note    Reason for Visit:  cirrhosis follow-up    PCP: Lillie, Primary Doctor       HPI:  This is a 63 y.o. male here for follow-up of: Cirrhosis    63 yr old male, initially seen on 7/26/22:  found to have thrombocytopenia, elevated enzymes, fatty liver on U/S with hepatosplenomegaly.  Raised suspicion for cirrhosis.  Had h/o alcohol intake, Two beers a day and one mix drink at night.  Pt is working on decreasing intake and getting help from his PCP.  Futher Eval of elevated enzymes revealed, ELVIA+, AMA+, IgG elevated 2280, elevated IgA 886.  PETH test positive multiple times.    Latest PETH result was 376 on 11/14/23, 2 weeks ago, with MELD 14.     MELD 3.0: 14 at 11/14/2023  7:06 AM  MELD-Na: 15 at 11/14/2023  7:06 AM  Calculated from:  Serum Creatinine: 1.3 mg/dL at 11/14/2023  7:06 AM  Serum Sodium: 136 mmol/L at 11/14/2023  7:06 AM  Total Bilirubin: 1.7 mg/dL at 11/14/2023  7:06 AM  Serum Albumin: 3.8 g/dL (Using max of 3.5 g/dL) at 11/14/2023  7:06 AM  INR(ratio): 1.3 at 11/14/2023  7:06 AM  Age at listing (hypothetical): 63 years  Sex: Male at 11/14/2023  7:06 AM     Patient states he "was sitting by the fire, and something hit him that many things he is dealing with, staying in a house living by himself, and the friends he has pretty much drink all the time, so he feels compelled to do what they do, and he "loves the taste of beer and [place]", so he drinks, what the friends offer him".  He drinks 4 drinks/day, so he keeps himself busy to keep off the alcohol. Since he started seeing me in the clinic, he drinks more water daily compared to prior to seeing me.  Overall, he is cutting down on alcohol, he says he is a "determined fellow" and he is making a change, slowly.  So, he will stop drinking alcohol, one of these days, and when he does, he will never go back to drinking.  HIs friend just found out he had brain cancer, and had surgery but not removed completely, so his " "life expectancy is shortened, and he supportive of him.         H/o renal failure - saw nephrology, improved, and was released from that service.     Fibroscan showed S0 steatosis, F4 fibrosis c/w cirrhosis.      Liver biopsy on 5/5/2022 showed: (done in [place], [place]).   Chronic hepatitis mild to moderate steatosis, moderate chronic inflammation, and stage 4 fibrosis.   Need to get slides for pathology review - get patient consent to have slides sent to us.       Interval History:8/25/23  Had anxiety past few weeks about various things in his life lately, including elevated PSA, helping other people, and not being appreciated, all have led him to drink alcohol.  Had restarted antabuse.      Labs 8/15/23:   Creat 1.6, otherwise LFTs improved.      MELD 3.0: 14 at 11/14/2023  7:06 AM  MELD-Na: 15 at 11/14/2023  7:06 AM  Calculated from:  Serum Creatinine: 1.3 mg/dL at 11/14/2023  7:06 AM  Serum Sodium: 136 mmol/L at 11/14/2023  7:06 AM  Total Bilirubin: 1.7 mg/dL at 11/14/2023  7:06 AM  Serum Albumin: 3.8 g/dL (Using max of 3.5 g/dL) at 11/14/2023  7:06 AM  INR(ratio): 1.3 at 11/14/2023  7:06 AM  Age at listing (hypothetical): 63 years  Sex: Male at 11/14/2023  7:06 AM     EGD 7/15/22: no varices.     Pt was informed he will have to maintain total sobriety prior to any consideration for transplant. Pt acknowledged this and is working on it.     I have told patient "No More alcohol"     INR normal, CR normal T Bili 2.1     Hepatitis C: No  Hepatitis B: No  Fatty liver: Yes  Encephalopathy: No  Post-hospital discharge: No  Symptoms: None      Primary hepatic manifestations:  Fatigue:Yes - improved  Edema:No  Ascites:No  Encephalopathy:No  Abdominal pain:No  GI bleeds: No  Pruritus:No  Weight Changes:No  Changes in Bowel habits: No  Muscle cramps:No      Risk factors for liver disease:  No jaundice  No transfusions  No IVDU  Did not snort cocaine or similar agents  Did not live with anyone with hepatitis B or C  Sexual " partner not tested  No hepatotoxic medications  No exposure to industrial toxins  Alcohol: Stopped 8 weeks , around May 25-June 1. Drank 6-pack /day and 2 mixed drinks in the evening.   Work[place] off [place] [employer] , [place]      ROS:  Constitutional: No fevers, chills, weight changes, fatigue  ENT: No allergies, nosebleeds,   CV: No chest pain  Pulm: No cough, shortness of breath  Ophtho: No vision changes  GI/Liver: see HPI  Derm: No rash, itching  Heme: No swollen glands, bruising  MSK: Right foot gout , end of the day swelling end of the day. - talk to PCP. May want to get a doppler on the leg.   : No dysuria, hematuria, decrease in urine output  Endo: No hot or cold intolerance  Neuro: No confusion, disorientation, difficulty with sleep, memory, concentration, syncope, seizure  Psych: No anxiety, depression    Medical History:  has no past medical history on file.    Surgical History:  has no past surgical history on file.    Family History: family history is not on file..     Social History:      Review of patient's allergies indicates:   Allergen Reactions    Penicillins Anaphylaxis         Objective Findings:    Vital Signs:  There were no vitals taken for this visit.  There is no height or weight on file to calculate BMI.    Physical Exam:  General Appearance: Well appearing in no acute distress  Head:   Normocephalic, without obvious abnormality  Eyes:    No scleral icterus, EOMI  ENT: Neck supple, Lips, mucosa, and tongue normal; teeth and gums normal  Lungs: CTA bilaterally in anterior and posterior fields, no wheezes, no crackles.  Heart:  Regular rate and rhythm, S1, S2 normal, no murmurs heard  Abdomen: Soft, non tender, non distended with positive bowel sounds in all four quadrants. No hepatosplenomegaly, ascites, or mass  Extremities: 2+ pulses, no clubbing, cyanosis or edema  Skin: No rash  Neurologic: CN II-XII intact, alert, oriented x 3. No asterixis      Labs:  Lab  Results   Component Value Date    WBC 4.96 11/14/2023    HGB 14.6 11/14/2023    HCT 42.5 11/14/2023    PLT 88 (L) 11/14/2023    INR 1.3 (H) 11/14/2023    CREATININE 1.3 11/14/2023    BUN 21 11/14/2023    BILITOT 1.7 (H) 11/14/2023    ALT 32 11/14/2023    AST 43 (H) 11/14/2023    ALKPHOS 59 11/14/2023     11/14/2023    K 4.3 11/14/2023     11/14/2023    CO2 23 11/14/2023    AFP 3.1 11/14/2023     MELD 3.0: 14 at 11/14/2023  7:06 AM  MELD-Na: 15 at 11/14/2023  7:06 AM  Calculated from:  Serum Creatinine: 1.3 mg/dL at 11/14/2023  7:06 AM  Serum Sodium: 136 mmol/L at 11/14/2023  7:06 AM  Total Bilirubin: 1.7 mg/dL at 11/14/2023  7:06 AM  Serum Albumin: 3.8 g/dL (Using max of 3.5 g/dL) at 11/14/2023  7:06 AM  INR(ratio): 1.3 at 11/14/2023  7:06 AM  Age at listing (hypothetical): 63 years  Sex: Male at 11/14/2023  7:06 AM     Imaging:       Endoscopy:      Assessment:  No diagnosis found.   Well-compensated Cirrhosis, alcohol related, still drinks a 6-pack/week, could be autoimmune hepatitis see biopsy below  has positive 1:40 ASMA, ELVIA+, AMA+, IgG elevated 2280, elevated IgA 886. but markers are borderline elevated and IgG 3000 was after vaccine.   Liver biopsy on 5/5/2022 showed: (done in [place]).   Chronic hepatitis mild to moderate steatosis, moderate chronic inflammation, and stage 4 fibrosis.   Need to get slides for pathology review - get patient consent to have slides sent to us.     PSA has been elevated for 10 +years.  Two prostate biopsies were negative for malignancy -     Viral hep markers neg, A1AT and cerulo normal.   MELD 7 days ago was 20.  PETH was 388 7 days ago.     I have told him his cirrhosis could be due alcohol, and he needs to stop all alcohol from hereon (3/2/23).    US 11/14/22: Mild hepatosplenomegaly, no focal lesion reported.       Recommendations:  -  Obtained liver biopsy slides for Pathology review and Path conf.  - submitted to pathology - conf note made. Will review next  week.    -  I have told him his cirrhosis could be due alcohol, and he needs to stop all alcohol from hereon (3/2/23).   -  Labs every 3 months next in 3 months before next visit:  CBC, CMP, PT INR, PETH  -  Low salt in the diet, avoid canned, bottled and processed foods.  -  Continue current meds  -  Ultrasound of abdomen and AFP every 6 months, next due in May 2023.   -  No alcohol, smoking, sedatives and meds with codeine.  -  Avoid high intake of Tylenol (more than 4 extra-strength (500 mg) pills in one day)  -  Call us if any bleeding, fevers, confusion, disorientation occur  -  Endoscopy: per GI MD locally  -  Transplant option discussed, will evaluate when MELD >15.  -  Return in 3 months.      No follow-ups on file.      Order summary:  No orders of the defined types were placed in this encounter.      Thank you so much for allowing me to participate in the care of Eulogio Aguero MD

## 2024-02-13 ENCOUNTER — LAB VISIT (OUTPATIENT)
Dept: LAB | Facility: HOSPITAL | Age: 64
End: 2024-02-13
Attending: INTERNAL MEDICINE
Payer: COMMERCIAL

## 2024-02-13 DIAGNOSIS — K74.69 OTHER CIRRHOSIS OF LIVER: ICD-10-CM

## 2024-02-13 DIAGNOSIS — F10.90 ALCOHOL INTAKE ABOVE RECOMMENDED SENSIBLE LIMITS: ICD-10-CM

## 2024-02-13 LAB
ALBUMIN SERPL BCP-MCNC: 3.8 G/DL (ref 3.5–5.2)
ALP SERPL-CCNC: 63 U/L (ref 55–135)
ALT SERPL W/O P-5'-P-CCNC: 45 U/L (ref 10–44)
ANION GAP SERPL CALC-SCNC: 8 MMOL/L (ref 8–16)
AST SERPL-CCNC: 66 U/L (ref 10–40)
BASOPHILS # BLD AUTO: 0.04 K/UL (ref 0–0.2)
BASOPHILS NFR BLD: 0.8 % (ref 0–1.9)
BILIRUB SERPL-MCNC: 2.1 MG/DL (ref 0.1–1)
BUN SERPL-MCNC: 22 MG/DL (ref 8–23)
CALCIUM SERPL-MCNC: 9.4 MG/DL (ref 8.7–10.5)
CHLORIDE SERPL-SCNC: 102 MMOL/L (ref 95–110)
CO2 SERPL-SCNC: 25 MMOL/L (ref 23–29)
CREAT SERPL-MCNC: 1.3 MG/DL (ref 0.5–1.4)
DIFFERENTIAL METHOD BLD: ABNORMAL
EOSINOPHIL # BLD AUTO: 0.3 K/UL (ref 0–0.5)
EOSINOPHIL NFR BLD: 5.9 % (ref 0–8)
ERYTHROCYTE [DISTWIDTH] IN BLOOD BY AUTOMATED COUNT: 14.1 % (ref 11.5–14.5)
EST. GFR  (NO RACE VARIABLE): >60 ML/MIN/1.73 M^2
GLUCOSE SERPL-MCNC: 126 MG/DL (ref 70–110)
HCT VFR BLD AUTO: 41.8 % (ref 40–54)
HGB BLD-MCNC: 14.1 G/DL (ref 14–18)
IMM GRANULOCYTES # BLD AUTO: 0.01 K/UL (ref 0–0.04)
IMM GRANULOCYTES NFR BLD AUTO: 0.2 % (ref 0–0.5)
INR PPP: 1.2 (ref 0.8–1.2)
LYMPHOCYTES # BLD AUTO: 1.2 K/UL (ref 1–4.8)
LYMPHOCYTES NFR BLD: 26.1 % (ref 18–48)
MCH RBC QN AUTO: 35.4 PG (ref 27–31)
MCHC RBC AUTO-ENTMCNC: 33.7 G/DL (ref 32–36)
MCV RBC AUTO: 105 FL (ref 82–98)
MONOCYTES # BLD AUTO: 0.5 K/UL (ref 0.3–1)
MONOCYTES NFR BLD: 11.4 % (ref 4–15)
NEUTROPHILS # BLD AUTO: 2.6 K/UL (ref 1.8–7.7)
NEUTROPHILS NFR BLD: 55.6 % (ref 38–73)
NRBC BLD-RTO: 0 /100 WBC
PLATELET # BLD AUTO: 66 K/UL (ref 150–450)
PMV BLD AUTO: 11.8 FL (ref 9.2–12.9)
POTASSIUM SERPL-SCNC: 4.1 MMOL/L (ref 3.5–5.1)
PROT SERPL-MCNC: 7.8 G/DL (ref 6–8.4)
PROTHROMBIN TIME: 12.6 SEC (ref 9–12.5)
RBC # BLD AUTO: 3.98 M/UL (ref 4.6–6.2)
SODIUM SERPL-SCNC: 135 MMOL/L (ref 136–145)
WBC # BLD AUTO: 4.75 K/UL (ref 3.9–12.7)

## 2024-02-13 PROCEDURE — 36415 COLL VENOUS BLD VENIPUNCTURE: CPT | Mod: PN | Performed by: INTERNAL MEDICINE

## 2024-02-13 PROCEDURE — 80321 ALCOHOLS BIOMARKERS 1OR 2: CPT | Performed by: INTERNAL MEDICINE

## 2024-02-13 PROCEDURE — 80053 COMPREHEN METABOLIC PANEL: CPT | Performed by: INTERNAL MEDICINE

## 2024-02-13 PROCEDURE — 85610 PROTHROMBIN TIME: CPT | Performed by: INTERNAL MEDICINE

## 2024-02-13 PROCEDURE — 85025 COMPLETE CBC W/AUTO DIFF WBC: CPT | Performed by: INTERNAL MEDICINE

## 2024-02-16 ENCOUNTER — TELEPHONE (OUTPATIENT)
Dept: HEPATOLOGY | Facility: CLINIC | Age: 64
End: 2024-02-16
Payer: COMMERCIAL

## 2024-02-16 ENCOUNTER — PATIENT MESSAGE (OUTPATIENT)
Dept: HEPATOLOGY | Facility: CLINIC | Age: 64
End: 2024-02-16
Payer: COMMERCIAL

## 2024-02-16 LAB
CLINICAL BIOCHEMIST REVIEW: NORMAL
PLPETH BLD-MCNC: NORMAL NG/ML
POPETH BLD-MCNC: NORMAL NG/ML

## 2024-02-16 NOTE — TELEPHONE ENCOUNTER
----- Message from Jt Mcallister MA sent at 2/15/2024  2:41 PM CST -----    ----- Message -----  From: June Aguero MD  Sent: 2/15/2024   4:19 AM CST  To: [facility] Staff    Please inform patient:   Liver enzymes are elevated again.  No need to check again if still drinking alcohol.

## 2024-02-16 NOTE — TELEPHONE ENCOUNTER
----- Message -----  From: June Aguero MD     Please inform patient:   Liver enzymes are elevated again.  No need to check again if still drinking alcohol.        Message and results placed in the mail to the patient.

## 2024-02-21 ENCOUNTER — TELEPHONE (OUTPATIENT)
Dept: HEPATOLOGY | Facility: CLINIC | Age: 64
End: 2024-02-21
Payer: COMMERCIAL

## 2024-02-21 NOTE — TELEPHONE ENCOUNTER
----- Message from Sam Bonilla sent at 2/21/2024  1:16 PM CST -----  Regarding: Specimen Issue       There was an issue with the Phosphatidyethanol test ordered on this patient from 2/13/24      Unfortunately, the reference lab received serum/plasma and whole blood is required for testing. The order has been cancelled. You will need to reorder and contact the patient for recollection if clinically indicated.     If there are any questions, please call the [facility] at [phone] ext. [phone].  Anyone in the [facility] lab will be able to assist you.